# Patient Record
Sex: FEMALE | Race: BLACK OR AFRICAN AMERICAN | NOT HISPANIC OR LATINO | Employment: FULL TIME | URBAN - METROPOLITAN AREA
[De-identification: names, ages, dates, MRNs, and addresses within clinical notes are randomized per-mention and may not be internally consistent; named-entity substitution may affect disease eponyms.]

---

## 2017-03-28 ENCOUNTER — OFFICE VISIT (OUTPATIENT)
Dept: FAMILY MEDICINE | Facility: CLINIC | Age: 52
End: 2017-03-28
Payer: COMMERCIAL

## 2017-03-28 VITALS
WEIGHT: 125 LBS | TEMPERATURE: 98.4 F | BODY MASS INDEX: 18.94 KG/M2 | SYSTOLIC BLOOD PRESSURE: 122 MMHG | OXYGEN SATURATION: 97 % | HEIGHT: 68 IN | DIASTOLIC BLOOD PRESSURE: 64 MMHG | HEART RATE: 92 BPM

## 2017-03-28 DIAGNOSIS — Z87.81 H/O CERVICAL FRACTURE: ICD-10-CM

## 2017-03-28 DIAGNOSIS — Z23 NEED FOR TETANUS BOOSTER: ICD-10-CM

## 2017-03-28 DIAGNOSIS — Z13.1 SCREENING FOR DIABETES MELLITUS: ICD-10-CM

## 2017-03-28 DIAGNOSIS — Z12.4 CERVICAL CANCER SCREENING: ICD-10-CM

## 2017-03-28 DIAGNOSIS — Z00.01 ENCOUNTER FOR ROUTINE ADULT HEALTH EXAMINATION WITH ABNORMAL FINDINGS: Primary | ICD-10-CM

## 2017-03-28 DIAGNOSIS — Z13.6 CARDIOVASCULAR SCREENING; LDL GOAL LESS THAN 160: ICD-10-CM

## 2017-03-28 DIAGNOSIS — L72.9 CYST OF SKIN: ICD-10-CM

## 2017-03-28 DIAGNOSIS — Z12.31 ENCOUNTER FOR SCREENING MAMMOGRAM FOR BREAST CANCER: ICD-10-CM

## 2017-03-28 DIAGNOSIS — Z72.0 TOBACCO ABUSE: ICD-10-CM

## 2017-03-28 DIAGNOSIS — Z12.11 COLON CANCER SCREENING: ICD-10-CM

## 2017-03-28 DIAGNOSIS — Z78.0 POST-MENOPAUSAL: ICD-10-CM

## 2017-03-28 DIAGNOSIS — Z11.59 NEED FOR HEPATITIS C SCREENING TEST: ICD-10-CM

## 2017-03-28 PROCEDURE — G0145 SCR C/V CYTO,THINLAYER,RESCR: HCPCS | Performed by: PHYSICIAN ASSISTANT

## 2017-03-28 PROCEDURE — 99386 PREV VISIT NEW AGE 40-64: CPT | Mod: 25 | Performed by: PHYSICIAN ASSISTANT

## 2017-03-28 PROCEDURE — 90715 TDAP VACCINE 7 YRS/> IM: CPT | Performed by: PHYSICIAN ASSISTANT

## 2017-03-28 PROCEDURE — 87624 HPV HI-RISK TYP POOLED RSLT: CPT | Performed by: PHYSICIAN ASSISTANT

## 2017-03-28 PROCEDURE — 90471 IMMUNIZATION ADMIN: CPT | Performed by: PHYSICIAN ASSISTANT

## 2017-03-28 NOTE — MR AVS SNAPSHOT
After Visit Summary   3/28/2017    Allyson Sauceda    MRN: [id number]           Patient Information     Date Of Birth          1965        Visit Information        Provider Department      3/28/2017 3:40 PM Dia Bolivar PA-C [facility]        Today's Diagnoses     Encounter for screening mammogram for breast cancer    -  1    Cervical cancer screening        Colon cancer screening        CARDIOVASCULAR SCREENING; LDL GOAL LESS THAN 160        Screening for diabetes mellitus        Need for hepatitis C screening test        Need for tetanus booster        H/O cervical fracture        Cyst of skin          Care Instructions      Preventive Health Recommendations  Female Ages 50 - 64    Yearly exam: See your health care provider every year in order to  o Review health changes.   o Discuss preventive care.    o Review your medicines if your doctor has prescribed any.      Get a Pap test every three years (unless you have an abnormal result and your provider advises testing more often).    If you get Pap tests with HPV test, you only need to test every 5 years, unless you have an abnormal result.     You do not need a Pap test if your uterus was removed (hysterectomy) and you have not had cancer.    You should be tested each year for STDs (sexually transmitted diseases) if you're at risk.     Have a mammogram every 1 to 2 years.    Have a colonoscopy at age 50, or have a yearly FIT test (stool test). These exams screen for colon cancer.      Have a cholesterol test every 5 years, or more often if advised.    Have a diabetes test (fasting glucose) every three years. If you are at risk for diabetes, you should have this test more often.     If you are at risk for osteoporosis (brittle bone disease), think about having a bone density scan (DEXA).    Shots: Get a flu shot each year. Get a tetanus shot every 10 years.    Nutrition:     Eat at least 5 servings of fruits and vegetables  each day.    Eat whole-grain bread, whole-wheat pasta and brown rice instead of white grains and rice.    Talk to your provider about Calcium and Vitamin D.     Lifestyle    Exercise at least 150 minutes a week (30 minutes a day, 5 days a week). This will help you control your weight and prevent disease.    Limit alcohol to one drink per day.    No smoking.     Wear sunscreen to prevent skin cancer.     See your dentist every six months for an exam and cleaning.    See your eye doctor every 1 to 2 years.          Follow-ups after your visit        Additional Services     GASTROENTEROLOGY ADULT REF PROCEDURE ONLY       Last Lab Result: No results found for: CR  Body mass index is 19.29 kg/(m^2).      Patient will be contacted to schedule procedure.     Please be aware that coverage of these services is subject to the terms and limitations of your health insurance plan.  Call member services at your health plan with any benefit or coverage questions.  Any procedures must be performed at a [facility] facility OR coordinated by your clinic's referral office.    Please bring the following with you to your appointment:    (1) Any X-Rays, CTs or MRIs which have been performed.  Contact the facility where they were done to arrange for  prior to your scheduled appointment.    (2) List of current medications   (3) This referral request   (4) Any documents/labs given to you for this referral            GENERAL SURG ADULT REFERRAL       Your provider has referred you to: [facility]: [facility] [place] [phone]   [email or web address]    Please be aware that coverage of these services is subject to the terms and limitations of your health insurance plan.  Call member services at your health plan with any benefit or coverage questions.      Please bring the following with you to your appointment:    (1) Any X-Rays, CTs or MRIs which have been performed.  Contact the  "facility where they were done to arrange for  prior to your scheduled appointment.   (2) List of current medications   (3) This referral request   (4) Any documents/labs given to you for this referral                  Future tests that were ordered for you today     Open Future Orders        Priority Expected Expires Ordered    **Hepatitis C Screen Reflex to RNA FUTURE anytime Routine 3/28/2017 3/28/2018 3/28/2017    Comprehensive metabolic panel (BMP + Alb, Alk Phos, ALT, AST, Total. Bili, TP) Routine  4/28/2017 3/28/2017    Lipid Profile with reflex to direct LDL Routine  4/28/2017 3/28/2017    *MA Screening Digital Bilateral Routine  3/28/2018 3/28/2017            Who to contact     If you have questions or need follow up information about today's clinic visit or your schedule please contact [facility] directly at [phone].  Normal or non-critical lab and imaging results will be communicated to you by MyChart, letter or phone within 4 business days after the clinic has received the results. If you do not hear from us within 7 days, please contact the clinic through [employer] or phone. If you have a critical or abnormal lab result, we will notify you by phone as soon as possible.  Submit refill requests through [employer] or call your pharmacy and they will forward the refill request to us. Please allow 3 business days for your refill to be completed.          Additional Information About Your Visit        [employer] Options Information     [employer] lets you send messages to your doctor, view your test results, renew your prescriptions, schedule appointments and more. To sign up, go to [email or web address] . Click on \"Log in\" on the left side of the screen, which will take you to the Welcome page. Then click on \"Sign up Now\" on the right side of the page.     You will be asked to enter the access code listed below, as well as some personal information. Please follow the directions to create your " "username and password.     Your access code is: [email or web address]  Expires: 2017  4:43 PM     Your access code will  in 90 days. If you need help or a new code, please call your [facility] clinic or [phone].        Care EveryWhere ID     This is your Care EveryWhere ID. This could be used by other organizations to access your [facility] medical records  [id number]        Your Vitals Were     Pulse Temperature Height Pulse Oximetry Breastfeeding? BMI (Body Mass Index)    92 98.4  F (36.9  C) (Oral) 5' 7.5\" (1.715 m) 97% No 19.29 kg/m2       Blood Pressure from Last 3 Encounters:   17 122/64    Weight from Last 3 Encounters:   17 125 lb (56.7 kg)              We Performed the Following     GASTROENTEROLOGY ADULT REF PROCEDURE ONLY     GENERAL SURG ADULT REFERRAL     HPV High Risk Types DNA Cervical     Pap imaged thin layer screen with HPV - recommended age 30 - 65 years (select HPV order below)     TDAP VACCINE (BOOSTRIX)        Primary Care Provider    None Specified       No primary provider on file.        Thank you!     Thank you for choosing [facility]  for your care. Our goal is always to provide you with excellent care. Hearing back from our patients is one way we can continue to improve our services. Please take a few minutes to complete the written survey that you may receive in the mail after your visit with us. Thank you!             Your Updated Medication List - Protect others around you: Learn how to safely use, store and throw away your medicines at www.disposemymeds.org.      Notice  As of 3/28/2017  4:43 PM    You have not been prescribed any medications.      "

## 2017-03-28 NOTE — LETTER
April 4, 2017    Allyson Sauceda  [address] [place] [address]    Dear Allyson,  We are happy to inform you that your PAP smear result from 3/28/17 is normal.  We are now able to do a follow up test on PAP smears. The DNA test is for HPV (Human Papilloma Virus). Cervical cancer is closely linked with certain types of HPV. Your result showed no evidence of high risk HPV.  Therefore we recommend you return in 3 years for your next pap smear.  You will still need to return to the clinic every year for an annual exam and other preventive tests.  Please contact the clinic at [phone] with any questions.  Sincerely,    Dia Bolivar PA-C/isabelle

## 2017-03-28 NOTE — PROGRESS NOTES
"   SUBJECTIVE:     CC: Allyson Sauceda is an 51 year old woman who presents for preventive health visit.     Healthy Habits:    Do you get at least three servings of calcium containing foods daily (dairy, green leafy vegetables, etc.)? no, taking calcium and/or vitamin D supplement: no - encouraged to start multivitamin.     Amount of exercise or daily activities, outside of work: none currently but starting this week three times a week with a . Trying to gain tone and muscle mass.    Problems taking medications regularly No    Medication side effects: No    Have you had an eye exam in the past two years? No     Do you see a dentist twice per year? yes    Do you have sleep apnea, excessive snoring or daytime drowsiness? no      Today's PHQ-2 Score: No flowsheet data found.    Abuse: Current or Past(Physical, Sexual or Emotional)- No  Do you feel safe in your environment - Yes    Social History   Substance Use Topics     Smoking status: Current Every Day Smoker     Types: Cigarettes     Smokeless tobacco: Not on file     Alcohol use No     The patient does not drink >3 drinks per day nor >7 drinks per week.    No results for input(s): CHOL, HDL, LDL, TRIG, CHOLHDLRATIO, NHDL in the last 65099 hours.    Reviewed orders with patient.  Reviewed health maintenance and updated orders accordingly - Yes    Mammo Decision Support:  Patient over age 50, mutual decision to screen reflected in health maintenance.    Pertinent mammograms are reviewed under the imaging tab.  History of abnormal Pap smear: NO - age 30- 65 PAP every 3 years recommended  Moved to [place] for past 10 yrs. Estimates 1-2 paps within that time frame.  Has had \"many\" in her lifetime which have all been normal.   with 1 partner    Reviewed and updated as needed this visit by clinical staff  Tobacco  Allergies  Meds  Med Hx  Surg Hx  Fam Hx  Soc Hx        Reviewed and updated as needed this visit by Provider            ROS:  C: NEGATIVE for " "fever, chills, change in weight  I: + for skin lesion she's had for years. Started small and seems to have gotten bigger over the years. Estimates shes had this for 10 yrs, but doesn't cause her any issues. NEGATIVE for worrisome rashes, moles or lesions  E: NEGATIVE for vision changes or irritation  ENT: NEGATIVE for ear, mouth and throat problems  R: NEGATIVE for significant cough or SOB  B: NEGATIVE for masses, tenderness or discharge  CV: NEGATIVE for chest pain, palpitations or peripheral edema  GI: NEGATIVE for nausea, abdominal pain, heartburn, or change in bowel habits  : NEGATIVE for unusual urinary or vaginal symptoms. No vaginal bleeding - LMP 2013  M: NEGATIVE for significant arthralgias or myalgia  N: NEGATIVE for weakness, dizziness or paresthesias  P: NEGATIVE for changes in mood or affect     Problem list, Medication list, Allergies, and Medical/Social/Surgical histories reviewed in EPIC and updated as appropriate.  OBJECTIVE:     /64 (BP Location: Right arm, Cuff Size: Adult Regular)  Pulse 92  Temp 98.4  F (36.9  C) (Oral)  Ht 5' 7.5\" (1.715 m)  Wt 125 lb (56.7 kg)  SpO2 97%  Breastfeeding? No  BMI 19.29 kg/m2  EXAM:  GENERAL: healthy, alert and no distress  EYES: Eyes grossly normal to inspection, PERRL and conjunctivae and sclerae normal  HENT: ear canals and TM's normal, nose and mouth without ulcers or lesions  NECK: no adenopathy, no asymmetry, masses, or scars and thyroid normal to palpation  RESP: lungs clear to auscultation - no rales, rhonchi or wheezes  BREAST: normal without masses, tenderness or nipple discharge and no palpable axillary masses or adenopathy  CV: regular rate and rhythm, normal S1 S2, no S3 or S4, no murmur, click or rub, no peripheral edema and peripheral pulses strong  ABDOMEN: soft, nontender, no hepatosplenomegaly, no masses and bowel sounds normal   (female): normal female external genitalia, normal urethral meatus, vaginal mucosa pink, moist, well " rugated, and normal cervix/adnexa/uterus without masses or discharge  MS: no gross musculoskeletal defects noted, no edema  SKIN: small cyst like prominence that is just distal to her L elbow adjacent to ulnar cubital tunnel - nontender and no evidence for infection. No other suspicious lesions or rashes  NEURO: Normal strength and tone, mentation intact and speech normal  PSYCH: mentation appears normal, affect normal/bright    ASSESSMENT/PLAN:         ICD-10-CM    1. Encounter for routine adult health examination with abnormal findings Z00.01    2. Encounter for screening mammogram for breast cancer Z12.31 *MA Screening Digital Bilateral   3. Cervical cancer screening Z12.4 Pap imaged thin layer screen with HPV - recommended age 30 - 65 years (select HPV order below)     HPV High Risk Types DNA Cervical   4. Colon cancer screening Z12.11 GASTROENTEROLOGY ADULT REF PROCEDURE ONLY   5. CARDIOVASCULAR SCREENING; LDL GOAL LESS THAN 160 Z13.6 Comprehensive metabolic panel (BMP + Alb, Alk Phos, ALT, AST, Total. Bili, TP)     Lipid Profile with reflex to direct LDL   6. Screening for diabetes mellitus Z13.1 Comprehensive metabolic panel (BMP + Alb, Alk Phos, ALT, AST, Total. Bili, TP)   7. Need for hepatitis C screening test Z11.59 **Hepatitis C Screen Reflex to RNA FUTURE anytime   8. Need for tetanus booster Z23 TDAP VACCINE (BOOSTRIX)   9. H/O cervical fracture Z87.81    10. Cyst of skin L72.9 GENERAL SURG ADULT REFERRAL   11. Post-menopausal Z78.0    12. Tobacco abuse Z72.0 nicotine (NICODERM CQ) 14 MG/24HR 24 hr patch     nicotine (NICODERM CQ) 21 MG/24HR 24 hr patch     nicotine (NICODERM CQ) 7 MG/24HR 24 hr patch   Given placement of cyst advised gen surg consultation as pt would like to have removed.  Referral placed and info given to her to schedule.  Discussed smoking cessation and she would be willing to try nicoderm patches. Encouraged 1 month f/u for additional cessation counseling.     COUNSELING:  "  Reviewed preventive health counseling, as reflected in patient instructions       Regular exercise       Healthy diet/nutrition       Vaccinated for: TDAP       Osteoporosis Prevention/Bone Health       Colon cancer screening       Consider Hep C screening for patients born between 1945 and 1965       (Jalyn)menopause management    BP Screening:   Last 3 BP Readings:    BP Readings from Last 3 Encounters:   03/28/17 122/64       The following was recommended to the patient:  Re-screen BP within a year and recommended lifestyle modifications     reports that she has been smoking Cigarettes.  She does not have any smokeless tobacco history on file.  Tobacco Cessation Action Plan: Pharmacotherapies : Nicotine patch with re-check in 1 month for continued cessation counseling.  Estimated body mass index is 19.29 kg/(m^2) as calculated from the following:    Height as of this encounter: 5' 7.5\" (1.715 m).    Weight as of this encounter: 125 lb (56.7 kg).   Weight management plan other subspecialty referral pt enrolled at her gym with nutritionist and to put on muscle mass.     Counseling Resources:  ATP IV Guidelines  Pooled Cohorts Equation Calculator  Breast Cancer Risk Calculator  FRAX Risk Assessment  ICSI Preventive Guidelines  Dietary Guidelines for Americans, 2010  USDA's MyPlate  ASA Prophylaxis  Lung CA Screening    Dia Bolivar PA-C  [facility] [place]  "

## 2017-03-28 NOTE — NURSING NOTE
"Chief Complaint   Patient presents with     Physical       Initial /64 (BP Location: Right arm, Cuff Size: Adult Regular)  Pulse 92  Temp 98.4  F (36.9  C) (Oral)  Ht 5' 7.5\" (1.715 m)  Wt 125 lb (56.7 kg)  SpO2 97%  Breastfeeding? No  BMI 19.29 kg/m2 Estimated body mass index is 19.29 kg/(m^2) as calculated from the following:    Height as of this encounter: 5' 7.5\" (1.715 m).    Weight as of this encounter: 125 lb (56.7 kg).  Medication Reconciliation: complete    "

## 2017-03-30 LAB
COPATH REPORT: NORMAL
PAP: NORMAL

## 2017-03-31 PROBLEM — Z78.0 POST-MENOPAUSAL: Status: ACTIVE | Noted: 2017-03-31

## 2017-03-31 PROBLEM — Z72.0 TOBACCO ABUSE: Status: ACTIVE | Noted: 2017-03-31

## 2017-03-31 RX ORDER — NICOTINE 21 MG/24HR
1 PATCH, TRANSDERMAL 24 HOURS TRANSDERMAL EVERY 24 HOURS
Qty: 30 PATCH | Refills: 0 | Status: SHIPPED | OUTPATIENT
Start: 2017-03-31 | End: 2021-07-02

## 2017-04-03 LAB
FINAL DIAGNOSIS: NORMAL
HPV HR 12 DNA CVX QL NAA+PROBE: NEGATIVE
HPV16 DNA SPEC QL NAA+PROBE: NEGATIVE
HPV18 DNA SPEC QL NAA+PROBE: NEGATIVE
SPECIMEN DESCRIPTION: NORMAL

## 2017-04-04 ENCOUNTER — RADIANT APPOINTMENT (OUTPATIENT)
Dept: MAMMOGRAPHY | Facility: CLINIC | Age: 52
End: 2017-04-04
Payer: COMMERCIAL

## 2017-04-04 DIAGNOSIS — Z12.31 ENCOUNTER FOR SCREENING MAMMOGRAM FOR BREAST CANCER: ICD-10-CM

## 2017-04-04 PROCEDURE — G0202 SCR MAMMO BI INCL CAD: HCPCS | Mod: TC

## 2017-04-07 DIAGNOSIS — Z13.6 CARDIOVASCULAR SCREENING; LDL GOAL LESS THAN 160: ICD-10-CM

## 2017-04-07 DIAGNOSIS — Z13.1 SCREENING FOR DIABETES MELLITUS: ICD-10-CM

## 2017-04-07 DIAGNOSIS — Z11.59 NEED FOR HEPATITIS C SCREENING TEST: ICD-10-CM

## 2017-04-07 LAB
ALBUMIN SERPL-MCNC: 3.9 G/DL (ref 3.4–5)
ALP SERPL-CCNC: 76 U/L (ref 40–150)
ALT SERPL W P-5'-P-CCNC: 27 U/L (ref 0–50)
ANION GAP SERPL CALCULATED.3IONS-SCNC: 8 MMOL/L (ref 3–14)
AST SERPL W P-5'-P-CCNC: 29 U/L (ref 0–45)
BILIRUB SERPL-MCNC: 1 MG/DL (ref 0.2–1.3)
BUN SERPL-MCNC: 9 MG/DL (ref 7–30)
CALCIUM SERPL-MCNC: 9.5 MG/DL (ref 8.5–10.1)
CHLORIDE SERPL-SCNC: 109 MMOL/L (ref 94–109)
CHOLEST SERPL-MCNC: 174 MG/DL
CO2 SERPL-SCNC: 27 MMOL/L (ref 20–32)
CREAT SERPL-MCNC: 0.7 MG/DL (ref 0.52–1.04)
GFR SERPL CREATININE-BSD FRML MDRD: 89 ML/MIN/1.7M2
GLUCOSE SERPL-MCNC: 96 MG/DL (ref 70–99)
HDLC SERPL-MCNC: 60 MG/DL
LDLC SERPL CALC-MCNC: 98 MG/DL
NONHDLC SERPL-MCNC: 114 MG/DL
POTASSIUM SERPL-SCNC: 3.7 MMOL/L (ref 3.4–5.3)
PROT SERPL-MCNC: 7.4 G/DL (ref 6.8–8.8)
SODIUM SERPL-SCNC: 144 MMOL/L (ref 133–144)
TRIGL SERPL-MCNC: 81 MG/DL

## 2017-04-07 PROCEDURE — 80061 LIPID PANEL: CPT | Performed by: PHYSICIAN ASSISTANT

## 2017-04-07 PROCEDURE — 80053 COMPREHEN METABOLIC PANEL: CPT | Performed by: PHYSICIAN ASSISTANT

## 2017-04-07 PROCEDURE — 36415 COLL VENOUS BLD VENIPUNCTURE: CPT | Performed by: PHYSICIAN ASSISTANT

## 2017-04-07 PROCEDURE — 86803 HEPATITIS C AB TEST: CPT | Performed by: PHYSICIAN ASSISTANT

## 2017-04-09 LAB — HCV AB SERPL QL IA: NORMAL

## 2017-04-11 ENCOUNTER — TELEPHONE (OUTPATIENT)
Dept: FAMILY MEDICINE | Facility: CLINIC | Age: 52
End: 2017-04-11

## 2017-04-11 DIAGNOSIS — L72.9 CYST OF SKIN: Primary | ICD-10-CM

## 2017-04-11 NOTE — TELEPHONE ENCOUNTER
Reason for Call: Patient called because she has a referral to general Surgery for her Cyst but whe she called they stated she should be seen by orthopedics.    Best phone number to reach pt at is: [phone]  Ok to leave a message with medical info? YES    Pharmacy preferred (if calling for a refill): [facility]  [facility]-Patient Representative

## 2017-04-11 NOTE — TELEPHONE ENCOUNTER
Given close proximity to elbow they may have felt ortho was a better fit. New referral placed. Please notify.  Electronically Signed By: Dia Bolivar PA-C

## 2017-04-11 NOTE — TELEPHONE ENCOUNTER
Please see message from patient below. Please advise. Thank you.  Annette Serrano RN, BSN  [facility]

## 2017-04-12 ENCOUNTER — OFFICE VISIT (OUTPATIENT)
Dept: FAMILY MEDICINE | Facility: CLINIC | Age: 52
End: 2017-04-12
Payer: COMMERCIAL

## 2017-04-12 VITALS
WEIGHT: 124 LBS | HEIGHT: 68 IN | OXYGEN SATURATION: 97 % | DIASTOLIC BLOOD PRESSURE: 64 MMHG | SYSTOLIC BLOOD PRESSURE: 98 MMHG | HEART RATE: 88 BPM | BODY MASS INDEX: 18.79 KG/M2 | TEMPERATURE: 98.1 F

## 2017-04-12 DIAGNOSIS — M79.642 PAIN OF LEFT HAND: Primary | ICD-10-CM

## 2017-04-12 PROCEDURE — 99213 OFFICE O/P EST LOW 20 MIN: CPT | Performed by: PHYSICIAN ASSISTANT

## 2017-04-12 NOTE — NURSING NOTE
"Chief Complaint   Patient presents with     Hand Pain       Initial There were no vitals taken for this visit. Estimated body mass index is 19.29 kg/(m^2) as calculated from the following:    Height as of 3/28/17: 5' 7.5\" (1.715 m).    Weight as of 3/28/17: 125 lb (56.7 kg).  Medication Reconciliation: complete    "

## 2017-04-12 NOTE — MR AVS SNAPSHOT
"              After Visit Summary   4/12/2017    Allyson Sauceda    MRN: [id number]           Patient Information     Date Of Birth          1965        Visit Information        Provider Department      4/12/2017 1:40 PM Dia Bolivar PA-C [facility] [place]        Care Instructions    Hx/exam most suggestive of possible early trigger finger, but no evidence for locking on exam today.  This may have been contributed to by recent increase in weight-lifting.  Encouraged to modify types of exercise, ice, avoid significant gripping/grasping and may take NSAIDs.  Re-check if worsening or new concerns.    Electronically Signed By: Dia Bolivar PA-C          Follow-ups after your visit        Who to contact     If you have questions or need follow up information about today's clinic visit or your schedule please contact [facility] [place] directly at [phone].  Normal or non-critical lab and imaging results will be communicated to you by MyChart, letter or phone within 4 business days after the clinic has received the results. If you do not hear from us within 7 days, please contact the clinic through MyChart or phone. If you have a critical or abnormal lab result, we will notify you by phone as soon as possible.  Submit refill requests through [employer] or call your pharmacy and they will forward the refill request to us. Please allow 3 business days for your refill to be completed.          Additional Information About Your Visit        MyChart Information     [employer] lets you send messages to your doctor, view your test results, renew your prescriptions, schedule appointments and more. To sign up, go to [email or web address] . Click on \"Log in\" on the left side of the screen, which will take you to the Welcome page. Then click on \"Sign up Now\" on the right side of the page.     You will be asked to enter the access code listed below, as well as some personal information. Please follow " "the directions to create your username and password.     Your access code is: [email or web address]  Expires: 2017  4:43 PM     Your access code will  in 90 days. If you need help or a new code, please call your [facility] clinic or [phone].        Care EveryWhere ID     This is your Care EveryWhere ID. This could be used by other organizations to access your [facility] medical records  [id number]        Your Vitals Were     Pulse Temperature Height Pulse Oximetry BMI (Body Mass Index)       105 98.1  F (36.7  C) (Oral) 5' 7.5\" (1.715 m) 97% 19.13 kg/m2        Blood Pressure from Last 3 Encounters:   17 98/64   17 122/64    Weight from Last 3 Encounters:   17 124 lb (56.2 kg)   17 125 lb (56.7 kg)              Today, you had the following     No orders found for display       Primary Care Provider    None Specified       No primary provider on file.        Thank you!     Thank you for choosing [facility] [place]  for your care. Our goal is always to provide you with excellent care. Hearing back from our patients is one way we can continue to improve our services. Please take a few minutes to complete the written survey that you may receive in the mail after your visit with us. Thank you!             Your Updated Medication List - Protect others around you: Learn how to safely use, store and throw away your medicines at www.disposemymeds.org.          This list is accurate as of: 17  2:06 PM.  Always use your most recent med list.                   Brand Name Dispense Instructions for use    * nicotine 14 MG/24HR 24 hr patch    NICODERM CQ    30 patch    Place 1 patch onto the skin every 24 hours       * nicotine 21 MG/24HR 24 hr patch    NICODERM CQ    30 patch    Place 1 patch onto the skin every 24 hours       * nicotine 7 MG/24HR 24 hr patch    NICODERM CQ    30 patch    Place 1 patch onto the skin every 24 hours       * Notice:  This list has 3 medication(s) that are the " same as other medications prescribed for you. Read the directions carefully, and ask your doctor or other care provider to review them with you.

## 2017-04-12 NOTE — PATIENT INSTRUCTIONS
Hx/exam most suggestive of possible early trigger finger, but no evidence for locking on exam today.  This may have been contributed to by recent increase in weight-lifting.  Encouraged to modify types of exercise, ice, avoid significant gripping/grasping and may take NSAIDs.  Re-check if worsening or new concerns.    Electronically Signed By: Dia Bolivar PA-C

## 2017-04-12 NOTE — PROGRESS NOTES
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    Did start working with a  over the past 3 weeks and has been doing more weight lifting.       Description:   Location: left hand, specifically middle finger  Character: not really a pain specifically - more discomfort. Finger gets stuck when she opens and closes her hand    Intensity: mild - describes as more discomfort    Progression of Symptoms: worse    Accompanying Signs & Symptoms: never is swollen.  Other symptoms: none   History:   Previous similar pain: no       Precipitating factors:   Trauma or overuse: no     Alleviating factors:  Improved by: using some aspercream and it seemed to help a little bit.       Therapies Tried and outcome: aspercream    2) Discuss recent labs    Problem list and histories reviewed & adjusted, as indicated.  Additional history: as documented    Patient Active Problem List   Diagnosis     CARDIOVASCULAR SCREENING; LDL GOAL LESS THAN 160     H/O cervical fracture     Post-menopausal     Tobacco abuse     Past Surgical History:   Procedure Laterality Date     NECK SURGERY  2005    C5-6 fracture - has plates placed       Social History   Substance Use Topics     Smoking status: Current Every Day Smoker     Packs/day: 0.50     Years: 30.00     Types: Cigarettes     Smokeless tobacco: Not on file     Alcohol use No     Family History   Problem Relation Age of Onset     DIABETES Mother      Hyperlipidemia Mother      Alzheimer Disease Mother      Hypertension Mother      Alzheimer Disease Maternal Grandmother      Alzheimer Disease Maternal Aunt      Breast Cancer No family hx of      Coronary Artery Disease No family hx of      Colon Cancer No family hx of      Thyroid  "Disease No family hx of      Genetic Disorder No family hx of          Current Outpatient Prescriptions   Medication Sig Dispense Refill     nicotine (NICODERM CQ) 14 MG/24HR 24 hr patch Place 1 patch onto the skin every 24 hours (Patient not taking: Reported on 4/12/2017) 30 patch 0     nicotine (NICODERM CQ) 21 MG/24HR 24 hr patch Place 1 patch onto the skin every 24 hours (Patient not taking: Reported on 4/12/2017) 30 patch 0     nicotine (NICODERM CQ) 7 MG/24HR 24 hr patch Place 1 patch onto the skin every 24 hours (Patient not taking: Reported on 4/12/2017) 30 patch 0     No Known Allergies    Reviewed and updated as needed this visit by clinical staff  Tobacco  Allergies  Meds  Med Hx  Surg Hx  Fam Hx  Soc Hx      Reviewed and updated as needed this visit by Provider  Tobacco  Allergies  Meds  Med Hx  Surg Hx  Fam Hx  Soc Hx        ROS:  CONSTITUTIONAL:NEGATIVE for fever, chills, change in weight  INTEGUMENTARY/SKIN: NEGATIVE for bruising, swelling or redness.   MUSCULOSKELETAL: POSITIVE  for L hand pain.    OBJECTIVE:                                                    BP 98/64 (BP Location: Right arm, Cuff Size: Adult Regular)  Pulse 88  Temp 98.1  F (36.7  C) (Oral)  Ht 5' 7.5\" (1.715 m)  Wt 124 lb (56.2 kg)  SpO2 97%  BMI 19.13 kg/m2  Body mass index is 19.13 kg/(m^2).  GENERAL: healthy, alert and no distress  MS: reports central palmar \"discomfort not pain\" and sensation like her L 3rd finger will get stuck. Can flex/extend all fingers today without any trouble though. No evidence of current triggering or locking. Equal and symmetric  strength. No bony deformity, synovial thickening or any swelling is seen. No dupuytrens contracture.    Diagnostic Test Results:  Results for orders placed or performed in visit on 04/07/17   Comprehensive metabolic panel (BMP + Alb, Alk Phos, ALT, AST, Total. Bili, TP)   Result Value Ref Range    Sodium 144 133 - 144 mmol/L    Potassium 3.7 3.4 - 5.3 " mmol/L    Chloride 109 94 - 109 mmol/L    Carbon Dioxide 27 20 - 32 mmol/L    Anion Gap 8 3 - 14 mmol/L    Glucose 96 70 - 99 mg/dL    Urea Nitrogen 9 7 - 30 mg/dL    Creatinine 0.70 0.52 - 1.04 mg/dL    GFR Estimate 89 >60 mL/min/1.7m2    GFR Estimate If Black >90   GFR Calc   >60 mL/min/1.7m2    Calcium 9.5 8.5 - 10.1 mg/dL    Bilirubin Total 1.0 0.2 - 1.3 mg/dL    Albumin 3.9 3.4 - 5.0 g/dL    Protein Total 7.4 6.8 - 8.8 g/dL    Alkaline Phosphatase 76 40 - 150 U/L    ALT 27 0 - 50 U/L    AST 29 0 - 45 U/L   Lipid Profile with reflex to direct LDL   Result Value Ref Range    Cholesterol 174 <200 mg/dL    Triglycerides 81 <150 mg/dL    HDL Cholesterol 60 >49 mg/dL    LDL Cholesterol Calculated 98 <100 mg/dL    Non HDL Cholesterol 114 <130 mg/dL   **Hepatitis C Screen Reflex to RNA FUTURE anytime   Result Value Ref Range    Hepatitis C Antibody  NR     Nonreactive   Assay performance characteristics have not been established for newborns,   infants, and children            ASSESSMENT/PLAN:                                                        ICD-10-CM    1. Pain of left hand M79.642    Describes trigger finger or some flexor tendonitis given recent increase in weight-lifting.  Provided reassurance that I do not think this is c/w arthritis.  Reviewed all nl labs from recent CPE as well.  See Patient Instructions  Patient Instructions   Hx/exam most suggestive of possible early trigger finger, but no evidence for locking on exam today.  This may have been contributed to by recent increase in weight-lifting.  Encouraged to modify types of exercise, ice, avoid significant gripping/grasping and may take NSAIDs.  Re-check if worsening or new concerns.    Electronically Signed By: Dia Bolivar PA-C

## 2017-06-12 ENCOUNTER — OFFICE VISIT (OUTPATIENT)
Dept: ORTHOPEDICS | Facility: CLINIC | Age: 52
End: 2017-06-12
Payer: COMMERCIAL

## 2017-06-12 VITALS
SYSTOLIC BLOOD PRESSURE: 102 MMHG | WEIGHT: 121 LBS | HEIGHT: 68 IN | DIASTOLIC BLOOD PRESSURE: 66 MMHG | BODY MASS INDEX: 18.34 KG/M2

## 2017-06-12 DIAGNOSIS — R22.32 ELBOW MASS, LEFT: Primary | ICD-10-CM

## 2017-06-12 PROCEDURE — 99203 OFFICE O/P NEW LOW 30 MIN: CPT | Performed by: ORTHOPAEDIC SURGERY

## 2017-06-12 NOTE — MR AVS SNAPSHOT
"              After Visit Summary   2017    Allyson Sauceda    MRN: [id number]           Patient Information     Date Of Birth          1965        Visit Information        Provider Department      2017 2:40 PM Sean Valadez MD [facility]        Today's Diagnoses     Elbow mass, left    -  1       Follow-ups after your visit        Who to contact     If you have questions or need follow up information about today's clinic visit or your schedule please contact [facility] directly at [phone].  Normal or non-critical lab and imaging results will be communicated to you by MICMALIhart, letter or phone within 4 business days after the clinic has received the results. If you do not hear from us within 7 days, please contact the clinic through [employer] or phone. If you have a critical or abnormal lab result, we will notify you by phone as soon as possible.  Submit refill requests through [employer] or call your pharmacy and they will forward the refill request to us. Please allow 3 business days for your refill to be completed.          Additional Information About Your Visit        MyChart Information     [employer] lets you send messages to your doctor, view your test results, renew your prescriptions, schedule appointments and more. To sign up, go to [email or web address] . Click on \"Log in\" on the left side of the screen, which will take you to the Welcome page. Then click on \"Sign up Now\" on the right side of the page.     You will be asked to enter the access code listed below, as well as some personal information. Please follow the directions to create your username and password.     Your access code is: [email or web address]  Expires: 2017  4:43 PM     Your access code will  in 90 days. If you need help or a new code, please call your [facility] or [phone].        Care EveryWhere ID     This is your Care EveryWhere ID. This could be used by other " "organizations to access your [place] medical records  [id number]        Your Vitals Were     Height BMI (Body Mass Index)                5' 7.9\" (1.725 m) 18.45 kg/m2           Blood Pressure from Last 3 Encounters:   06/12/17 102/66   04/12/17 98/64   03/28/17 122/64    Weight from Last 3 Encounters:   06/12/17 121 lb (54.9 kg)   04/12/17 124 lb (56.2 kg)   03/28/17 125 lb (56.7 kg)              Today, you had the following     No orders found for display       Primary Care Provider    None Specified       No primary provider on file.        Thank you!     Thank you for choosing [facility]  for your care. Our goal is always to provide you with excellent care. Hearing back from our patients is one way we can continue to improve our services. Please take a few minutes to complete the written survey that you may receive in the mail after your visit with us. Thank you!             Your Updated Medication List - Protect others around you: Learn how to safely use, store and throw away your medicines at www.disposemymeds.org.          This list is accurate as of: 6/12/17  3:15 PM.  Always use your most recent med list.                   Brand Name Dispense Instructions for use    HAIR SKIN & NAILS GUMMIES PO          MULTIVITAMIN GUMMIES ADULTS PO          * nicotine 14 MG/24HR 24 hr patch    NICODERM CQ    30 patch    Place 1 patch onto the skin every 24 hours       * nicotine 21 MG/24HR 24 hr patch    NICODERM CQ    30 patch    Place 1 patch onto the skin every 24 hours       * nicotine 7 MG/24HR 24 hr patch    NICODERM CQ    30 patch    Place 1 patch onto the skin every 24 hours       * Notice:  This list has 3 medication(s) that are the same as other medications prescribed for you. Read the directions carefully, and ask your doctor or other care provider to review them with you.      "

## 2017-06-12 NOTE — PROGRESS NOTES
HISTORY OF PRESENT ILLNESS:    Allyson Sauceda is a 51 year old female who is seen in consultation at the request of Dr. Bolivar for left elbow, cyst.    Present symptoms: Pt states cyst has been present for over a year, has slowly grown in size.  States it is not painful but will have some discomfort if resting on elbow for extended periods.  She is right-hand dominant.  No limitation of elbow range of motion  No constitutional symptoms Of significant weight loss, nighttime fever or chills  Treatments tried to this point: nothing  Orthopedic PMH: neck surgery - C5/6    No past medical history on file.None other than prior neck problem    Past Surgical History:   Procedure Laterality Date     NECK SURGERY  2005    C5-6 fracture - has plates placed       Family History   Problem Relation Age of Onset     DIABETES Mother      Hyperlipidemia Mother      Alzheimer Disease Mother      Hypertension Mother      Alzheimer Disease Maternal Grandmother      Alzheimer Disease Maternal Aunt      Breast Cancer No family hx of      Coronary Artery Disease No family hx of      Colon Cancer No family hx of      Thyroid Disease No family hx of      Genetic Disorder No family hx of        Social History     Social History     Marital status:      Spouse name: N/A     Number of children: N/A     Years of education: N/A     Occupational History     Not on file.     Social History Main Topics     Smoking status: Current Every Day Smoker     Packs/day: 0.50     Years: 30.00     Types: Cigarettes     Smokeless tobacco: Not on file     Alcohol use No     Drug use: Yes      Comment: Marjuana     Sexual activity: Yes     Partners: Male     Other Topics Concern     Not on file     Social History Narrative       Current Outpatient Prescriptions   Medication Sig Dispense Refill     Multiple Vitamins-Minerals (MULTIVITAMIN GUMMIES ADULTS PO)        Biotin w/ Vitamins C & E (HAIR SKIN & NAILS GUMMIES PO)        nicotine (NICODERM CQ) 14  "MG/24HR 24 hr patch Place 1 patch onto the skin every 24 hours (Patient not taking: Reported on 4/12/2017) 30 patch 0     nicotine (NICODERM CQ) 21 MG/24HR 24 hr patch Place 1 patch onto the skin every 24 hours (Patient not taking: Reported on 4/12/2017) 30 patch 0     nicotine (NICODERM CQ) 7 MG/24HR 24 hr patch Place 1 patch onto the skin every 24 hours (Patient not taking: Reported on 4/12/2017) 30 patch 0       No Known Allergies    REVIEW OF SYSTEMS:  CONSTITUTIONAL:  NEGATIVE for fever, chills, change in weight  INTEGUMENTARY/SKIN:  NEGATIVE for worrisome rashes, moles or lesions  EYES:  NEGATIVE for vision changes or irritation  ENT/MOUTH:  NEGATIVE for ear, mouth and throat problems  RESP:  NEGATIVE for significant cough or SOB  BREAST:  NEGATIVE for masses, tenderness or discharge  CV:  NEGATIVE for chest pain, palpitations or peripheral edema  GI:  abdominal pain  :  Negative   MUSCULOSKELETAL:  See HPI above  NEURO:  NEGATIVE for weakness, dizziness or paresthesias  ENDOCRINE:  NEGATIVE for temperature intolerance, skin/hair changes  HEME/ALLERGY/IMMUNE:  NEGATIVE for bleeding problems  PSYCHIATRIC:  NEGATIVE for changes in mood or affect      PHYSICAL EXAM:  /66 (BP Location: Right arm, Patient Position: Chair, Cuff Size: Adult Small)  Ht 5' 7.9\" (1.725 m)  Wt 121 lb (54.9 kg)  BMI 18.45 kg/m2  Body mass index is 18.45 kg/(m^2).   GENERAL APPEARANCE: healthy, alert and no distress   HEENT: No apparent thyroid megaly. Clear sclera with normal ocular movement  RESPIRATORY: No labored breathing  SKIN: no suspicious lesions or rashes  NEURO: Normal strength and tone, mentation intact and speech normal  VASCULAR: Good pulses, and capillary refill   LYMPH: no lymphadenopathy   PSYCH:  mentation appears normal and affect normal/bright    MUSCULOSKELETAL:  Presence of semi-firm subcutaneous mass in the olecranon region of the left elbow is noted  It is not pulsatile  No fluctuant nature is noted  The " size of the mass is about 1.5 cm in diameter, fairly circular  Full flexion and extension of left elbow is noted  The particular tenderness with palpation of the mass  Distal neurovascular status is intact     ASSESSMENT:  Posterior elbow mass, 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  Given the fact that she has noted rather noticeable enlargement of the size, it is very reasonable for her to consider surgical excision.  Choices for anesthesia, namely local versus local MAC were explained.  If she were to do it, she is leaning towards doing it under local MAC.  Possible recurrent formation assist and potential wound issues including the possibility of keloid were thoroughly explained.  I asked her to go home and think it through and get back to us if she decided to go ahead with the surgery sometime in the future.  The proposed surgery would include excision of the left elbow mass under local MAC.      Imaging Interpretation:   None taken today    Sean Valadez MD  Department of Orthopedic Surgery        Disclaimer: This note consists of symbols derived from keyboarding, dictation and/or voice recognition software. As a result, there may be errors in the script that have gone undetected. Please consider this when interpreting information found in this chart.

## 2017-06-12 NOTE — NURSING NOTE
"Chief Complaint   Patient presents with     Cyst     Left elbow       Initial /66 (BP Location: Right arm, Patient Position: Chair, Cuff Size: Adult Small)  Ht 5' 7.9\" (1.725 m)  Wt 121 lb (54.9 kg)  BMI 18.45 kg/m2 Estimated body mass index is 18.45 kg/(m^2) as calculated from the following:    Height as of this encounter: 5' 7.9\" (1.725 m).    Weight as of this encounter: 121 lb (54.9 kg).  Medication Reconciliation: complete    "

## 2017-06-15 ENCOUNTER — TELEPHONE (OUTPATIENT)
Dept: ORTHOPEDICS | Facility: CLINIC | Age: 52
End: 2017-06-15

## 2017-06-15 NOTE — TELEPHONE ENCOUNTER
Scheduled surgery for  Excision of left elbow  on 7/05/2017 with Dr. Valadez @ [facility] @ 2:45.  Surgery education packet provided to patient.

## 2017-06-23 ENCOUNTER — TELEPHONE (OUTPATIENT)
Dept: FAMILY MEDICINE | Facility: CLINIC | Age: 52
End: 2017-06-23

## 2017-06-23 NOTE — TELEPHONE ENCOUNTER
Pt called and would like to know if she can be referred to see a specialist for her hand pain. She can be reached at [phone]. States she has seen LT for this issue 3 times.    Ruddy Ortiz  Patient Representative-[facility]

## 2017-06-23 NOTE — TELEPHONE ENCOUNTER
Dia please see below and advise. You saw patient on 4/12 for this issue. Or would this referral come from Dr. Valadez, sports med?  Thank you,   Antonella Garcia R.N.

## 2017-06-26 ENCOUNTER — RADIANT APPOINTMENT (OUTPATIENT)
Dept: GENERAL RADIOLOGY | Facility: CLINIC | Age: 52
End: 2017-06-26
Attending: PHYSICIAN ASSISTANT
Payer: COMMERCIAL

## 2017-06-26 ENCOUNTER — OFFICE VISIT (OUTPATIENT)
Dept: FAMILY MEDICINE | Facility: CLINIC | Age: 52
End: 2017-06-26
Payer: COMMERCIAL

## 2017-06-26 VITALS
SYSTOLIC BLOOD PRESSURE: 98 MMHG | TEMPERATURE: 98.7 F | HEIGHT: 68 IN | WEIGHT: 121 LBS | HEART RATE: 72 BPM | BODY MASS INDEX: 18.34 KG/M2 | OXYGEN SATURATION: 97 % | DIASTOLIC BLOOD PRESSURE: 60 MMHG

## 2017-06-26 DIAGNOSIS — M79.645 PAIN OF FINGER OF LEFT HAND: ICD-10-CM

## 2017-06-26 DIAGNOSIS — M79.644 PAIN OF RIGHT THUMB: ICD-10-CM

## 2017-06-26 DIAGNOSIS — Z01.818 PREOP GENERAL PHYSICAL EXAM: Primary | ICD-10-CM

## 2017-06-26 DIAGNOSIS — Z72.0 TOBACCO ABUSE: ICD-10-CM

## 2017-06-26 PROCEDURE — 99214 OFFICE O/P EST MOD 30 MIN: CPT | Performed by: PHYSICIAN ASSISTANT

## 2017-06-26 PROCEDURE — 73140 X-RAY EXAM OF FINGER(S): CPT | Mod: RT

## 2017-06-26 NOTE — TELEPHONE ENCOUNTER
We certainly could consider having her be evaluated by the hand specialist, but if she is continuing to have pain we might want to consider further lab work-up first for rheumatoid arthritis. I think it's best we re-evaluate her for this first. Is she ok with that? I have seen her only 2x and only once for her hand pain.   Electronically Signed By: Dia Bolivar PA-C
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
## 2017-06-26 NOTE — TELEPHONE ENCOUNTER
"Called # below     \" the person you are trying to reach is not accepting calls at this time\"     Will attempt later     Peggy Landaverde RN, BSN  [facility] [facility]       "

## 2017-06-26 NOTE — NURSING NOTE
"Chief Complaint   Patient presents with     Pre-Op Exam       Initial BP 98/60  Pulse 102  Temp 98.7  F (37.1  C) (Oral)  Ht 5' 7.5\" (1.715 m)  Wt 121 lb (54.9 kg)  SpO2 97%  Breastfeeding? No  BMI 18.67 kg/m2 Estimated body mass index is 18.67 kg/(m^2) as calculated from the following:    Height as of this encounter: 5' 7.5\" (1.715 m).    Weight as of this encounter: 121 lb (54.9 kg).  Medication Reconciliation: complete      "

## 2017-06-26 NOTE — MR AVS SNAPSHOT
After Visit Summary   6/26/2017    Allyson Sauceda    MRN: [id number]           Patient Information     Date Of Birth          1965        Visit Information        Provider Department      6/26/2017 4:00 PM Dia Bolivar PA-C [facility] [place]        Today's Diagnoses     Preop general physical exam    -  1    Pain of right thumb        Pain of finger of left hand        Tobacco abuse        BMI less than 19,adult          Care Instructions      Before Your Surgery      Call your surgeon if there is any change in your health. This includes signs of a cold or flu (such as a sore throat, runny nose, cough, rash or fever).    Do not smoke, drink alcohol or take over the counter medicine (unless your surgeon or primary care doctor tells you to) for the 24 hours before and after surgery.    If you take prescribed drugs: Follow your doctor s orders about which medicines to take and which to stop until after surgery.    Eating and drinking prior to surgery: follow the instructions from your surgeon    Take a shower or bath the night before surgery. Use the soap your surgeon gave you to gently clean your skin. If you do not have soap from your surgeon, use your regular soap. Do not shave or scrub the surgery site.  Wear clean pajamas and have clean sheets on your bed.           Follow-ups after your visit        Your next 10 appointments already scheduled     Jul 03, 2017  3:40 PM CDT   SHORT with Dia Bolivar PA-C   [facility] [place] ([facility])    [address] [address]   [phone]            Jul 17, 2017  1:40 PM CDT   Return Visit with Davis Bella PA-C   [facility] ([facility])    [address]  [facility] [address]   [phone]              Who to contact     If you have questions or need follow up information about today's clinic visit or your schedule  "please contact [facility] [place] directly at [phone].  Normal or non-critical lab and imaging results will be communicated to you by MyChart, letter or phone within 4 business days after the clinic has received the results. If you do not hear from us within 7 days, please contact the clinic through MyChart or phone. If you have a critical or abnormal lab result, we will notify you by phone as soon as possible.  Submit refill requests through [employer] or call your pharmacy and they will forward the refill request to us. Please allow 3 business days for your refill to be completed.          Additional Information About Your Visit        Schedule [facility] Information     [employer] lets you send messages to your doctor, view your test results, renew your prescriptions, schedule appointments and more. To sign up, go to [email or web address] . Click on \"Log in\" on the left side of the screen, which will take you to the Welcome page. Then click on \"Sign up Now\" on the right side of the page.     You will be asked to enter the access code listed below, as well as some personal information. Please follow the directions to create your username and password.     Your access code is: [email or web address]  Expires: 2017 11:09 AM     Your access code will  in 90 days. If you need help or a new code, please call your [facility] clinic or [phone].        Care EveryWhere ID     This is your Care EveryWhere ID. This could be used by other organizations to access your [facility] medical records  [id number]        Your Vitals Were     Pulse Temperature Height Pulse Oximetry Breastfeeding? BMI (Body Mass Index)    72 98.7  F (37.1  C) (Oral) 5' 7.5\" (1.715 m) 97% No 18.67 kg/m2       Blood Pressure from Last 3 Encounters:   17 98/60   17 102/66   17 98/64    Weight from Last 3 Encounters:   17 121 lb (54.9 kg)   17 121 lb (54.9 kg)   17 124 lb (56.2 kg)              We Performed the Following  "    XR Finger Left G/E 2 Views     XR Finger Right G/E 2 Views          Today's Medication Changes          These changes are accurate as of: 6/26/17 11:59 PM.  If you have any questions, ask your nurse or doctor.               Start taking these medicines.        Dose/Directions    order for DME   Used for:  Pain of right thumb   Started by:  Dia Bolivar PA-C        Right wrist/thumb brace   Quantity:  1 Device   Refills:  0            Where to get your medicines      Some of these will need a paper prescription and others can be bought over the counter.  Ask your nurse if you have questions.     Bring a paper prescription for each of these medications     order for DME                Primary Care Provider    None Specified       No primary provider on file.        Equal Access to Services     PANFILO PARKER : Kasey Harrison, skylar denny, ludwig waldrop, kelechi vee. So [facility] [phone].    ATENCIÓN: Si habla español, tiene a thibodeaux disposición servicios gratuitos de asistencia lingüística. Llame al [phone].    We comply with applicable federal civil rights laws and [place] laws. We do not discriminate on the basis of race, color, national origin, age, disability sex, sexual orientation or gender identity.            Thank you!     Thank you for choosing [facility] [place]  for your care. Our goal is always to provide you with excellent care. Hearing back from our patients is one way we can continue to improve our services. Please take a few minutes to complete the written survey that you may receive in the mail after your visit with us. Thank you!             Your Updated Medication List - Protect others around you: Learn how to safely use, store and throw away your medicines at www.disposemymeds.org.          This list is accurate as of: 6/26/17 11:59 PM.  Always use your most recent med list.                   Brand Name Dispense  Instructions for use Diagnosis    HAIR SKIN & NAILS GUMMIES PO           MULTIVITAMIN GUMMIES ADULTS PO           * nicotine 14 MG/24HR 24 hr patch    NICODERM CQ    30 patch    Place 1 patch onto the skin every 24 hours    Tobacco abuse       * nicotine 21 MG/24HR 24 hr patch    NICODERM CQ    30 patch    Place 1 patch onto the skin every 24 hours    Tobacco abuse       * nicotine 7 MG/24HR 24 hr patch    NICODERM CQ    30 patch    Place 1 patch onto the skin every 24 hours    Tobacco abuse       order for DME     1 Device    Right wrist/thumb brace    Pain of right thumb       * Notice:  This list has 3 medication(s) that are the same as other medications prescribed for you. Read the directions carefully, and ask your doctor or other care provider to review them with you.

## 2017-06-28 ENCOUNTER — TELEPHONE (OUTPATIENT)
Dept: FAMILY MEDICINE | Facility: CLINIC | Age: 52
End: 2017-06-28

## 2017-06-28 NOTE — TELEPHONE ENCOUNTER
Patient calling for results from finger x ray.  No result notes and recommendations noted by provider.    Please review.  Elise Kunz RN  Triage [employer]

## 2017-06-28 NOTE — TELEPHONE ENCOUNTER
Please notify pt that her x-rays were normal. Mild hyperextension of the distal part of her 3rd finger was noted by radiology, but likely to be a normal variant in her case given exam. Please follow-up as previously discussed in clinic.  Electronically Signed By: Dia Bolivar PA-C

## 2017-06-29 ENCOUNTER — NURSE TRIAGE (OUTPATIENT)
Dept: NURSING | Facility: CLINIC | Age: 52
End: 2017-06-29

## 2017-06-29 NOTE — TELEPHONE ENCOUNTER
"  Additional Information    Negative: [1] Caller is not with the adult (patient) AND [2] reporting urgent symptoms    Negative: Medication questions    Negative: Lab result questions    Negative: Caller cannot be reached by phone    Negative: Caller has already spoken to PCP or another triager    Negative: RN needs further essential information from caller in order to complete triage    Negative: Requesting regular office appointment    Negative: [1] Caller requesting NON-URGENT health information AND [2] PCP's office is the best resource    Health Information question, no triage required and triager able to answer question     \"I am returning a call from the clinic regarding my Xray results.\" Gave message per epic/MD. Also transferred patient to scheduling for appt.    Protocols used: INFORMATION ONLY CALL-ADULT-    "

## 2017-06-29 NOTE — TELEPHONE ENCOUNTER
Called # [phone]    Left a non detailed VM     Peggy Landaverde RN, BSN  [facility]

## 2017-06-30 NOTE — TELEPHONE ENCOUNTER
Finger is hurting, can't bend.  Type all day at work.  Brace is helping.   Per Dia, she has choices:  !. She could put off surgery for elbow and treat the trigger finger with ibuprofen.  Can't take ibu before the surgery  2. Do surgery then address trigger finger.  She could see same ortho    She will do surgery for elbow then decide what to do next at that point  Symone Pierre RN- Triage [employer]

## 2017-07-05 ENCOUNTER — HOSPITAL PATHOLOGY (OUTPATIENT)
Dept: OTHER | Facility: CLINIC | Age: 52
End: 2017-07-05

## 2017-07-05 ENCOUNTER — TRANSFERRED RECORDS (OUTPATIENT)
Dept: HEALTH INFORMATION MANAGEMENT | Facility: CLINIC | Age: 52
End: 2017-07-05

## 2017-07-07 ENCOUNTER — TELEPHONE (OUTPATIENT)
Dept: ORTHOPEDICS | Facility: CLINIC | Age: 52
End: 2017-07-07

## 2017-07-07 LAB — COPATH REPORT: NORMAL

## 2017-07-07 NOTE — TELEPHONE ENCOUNTER
NA / LVM - Surgical follow up call: Left non descript, confirming follow up and left phone number for questions.    Davis Bella PA-C  [facility] - Surgery

## 2017-07-17 ENCOUNTER — OFFICE VISIT (OUTPATIENT)
Dept: ORTHOPEDICS | Facility: CLINIC | Age: 52
End: 2017-07-17
Payer: COMMERCIAL

## 2017-07-17 DIAGNOSIS — Z47.89 ORTHOPEDIC AFTERCARE: Primary | ICD-10-CM

## 2017-07-17 PROCEDURE — 99024 POSTOP FOLLOW-UP VISIT: CPT | Performed by: PHYSICIAN ASSISTANT

## 2017-07-17 NOTE — LETTER
[facility]  [address] [address] [address]  [phone]          July 17, 2017    RE:  Allyson Sauceda                                                                                                                                                       [address] [place] [address]            To whom it may concern:    Allyson Sauceda is under my professional care for left elbow surgery.      Based on the level of activity allowed, please excuse her from [employer] gym for the month of July 2017.      Sincerely,        Davis Bella PA-C

## 2017-07-17 NOTE — MR AVS SNAPSHOT
After Visit Summary   7/17/2017    Allyson Sauceda    MRN: [id number]           Patient Information     Date Of Birth          1965        Visit Information        Provider Department      7/17/2017 1:40 PM Davis Bella PA-C [facility]        Today's Diagnoses     Orthopedic aftercare    -  1      Care Instructions      Incision Care:  Sutures were removed and Steri-Strips applied in usual fashion.  Keep dry 24-48 hours.  Showering ok after that time, however no soaking or scrubbing of incision for 1 weeks.  Steri-strips will most likely fall off on their own, however they may be removed after 1 weeks with rubbing alcohol if they have not.    If there is any significant drainage after the first 2 days, please cover the incision and notify the office.    Gradually increase your activities as you can tolerated them, starting at a level well below what you would normally do.   Follow up as needed in clinic.            Follow-ups after your visit        Follow-up notes from your care team     Return if symptoms worsen or fail to improve.      Who to contact     If you have questions or need follow up information about today's clinic visit or your schedule please contact [facility] directly at [phone].  Normal or non-critical lab and imaging results will be communicated to you by MyChart, letter or phone within 4 business days after the clinic has received the results. If you do not hear from us within 7 days, please contact the clinic through Reveehart or phone. If you have a critical or abnormal lab result, we will notify you by phone as soon as possible.  Submit refill requests through Voxbone or call your pharmacy and they will forward the refill request to us. Please allow 3 business days for your refill to be completed.          Additional Information About Your Visit        ReveeharRAREFORM Information     Voxbone lets you send messages to your  "doctor, view your test results, renew your prescriptions, schedule appointments and more. To sign up, go to [email or web address] . Click on \"Log in\" on the left side of the screen, which will take you to the Welcome page. Then click on \"Sign up Now\" on the right side of the page.     You will be asked to enter the access code listed below, as well as some personal information. Please follow the directions to create your username and password.     Your access code is: [email or web address]  Expires: 2017 11:09 AM     Your access code will  in 90 days. If you need help or a new code, please call your [place] clinic or [phone].        Care EveryWhere ID     This is your Care EveryWhere ID. This could be used by other organizations to access your [place] medical records  [id number]         Blood Pressure from Last 3 Encounters:   17 98/60   17 102/66   17 98/64    Weight from Last 3 Encounters:   17 121 lb (54.9 kg)   17 121 lb (54.9 kg)   17 124 lb (56.2 kg)              Today, you had the following     No orders found for display       Primary Care Provider    None Specified       No primary provider on file.        Equal Access to Services     [facility]: Hadii addie starkso Soemma, waaxda luqadaha, qaybta kaalmada adeegyada, kelechi anna . So [facility] [phone].    ATENCIÓN: Si habla español, tiene a thibodeaux disposición servicios gratuitos de asistencia lingüística. Llame al [phone].    We comply with applicable federal civil rights laws and [place] laws. We do not discriminate on the basis of race, color, national origin, age, disability sex, sexual orientation or gender identity.            Thank you!     Thank you for choosing [facility]  for your care. Our goal is always to provide you with excellent care. Hearing back from our patients is one way we can continue to improve our services. Please take a few " minutes to complete the written survey that you may receive in the mail after your visit with us. Thank you!             Your Updated Medication List - Protect others around you: Learn how to safely use, store and throw away your medicines at www.disposemymeds.org.          This list is accurate as of: 7/17/17  2:02 PM.  Always use your most recent med list.                   Brand Name Dispense Instructions for use Diagnosis    HAIR SKIN & NAILS GUMMIES PO           MULTIVITAMIN GUMMIES ADULTS PO           * nicotine 14 MG/24HR 24 hr patch    NICODERM CQ    30 patch    Place 1 patch onto the skin every 24 hours    Tobacco abuse       * nicotine 21 MG/24HR 24 hr patch    NICODERM CQ    30 patch    Place 1 patch onto the skin every 24 hours    Tobacco abuse       * nicotine 7 MG/24HR 24 hr patch    NICODERM CQ    30 patch    Place 1 patch onto the skin every 24 hours    Tobacco abuse       order for DME     1 Device    Right wrist/thumb brace    Pain of right thumb       * Notice:  This list has 3 medication(s) that are the same as other medications prescribed for you. Read the directions carefully, and ask your doctor or other care provider to review them with you.

## 2017-07-17 NOTE — PROGRESS NOTES
HISTORY OF PRESENT ILLNESS:    Allyson Sauceda is a 51 year old female who is seen in follow up for Left elbow mass excision, DOS 7/5/2017, Dr. Valadez.  Present symptoms: has not removed surgical dressing.  Using arm limited.  No new [id number].   Denies Chest pain, Calve pain, Fever, Chills.    Current Treatment: post op, dressing.     PHYSICAL EXAM:  There were no vitals taken for this visit.  There is no height or weight on file to calculate BMI.   GENERAL APPEARANCE: healthy, alert and no distress   PSYCH:  mentation appears normal and affect normal/bright    MSK:  Left: Elbow .  Ambulates: WNG.  Incision clean and dry, sutures present, healing.  No incisional erythema.   No Ecchymosis.  No calve pain on palpation.  Edema min at elbow, no fluctuance.  CMS: julio incisional numbness, otherwise grossly intact.  AROM WNL..      IMAGING INTERPRETATION:  None..       ASSESSMENT:  Allyson Sauceda is a 51 year old female S/P Left elbow mass excision, DOS 7/5/2017, Dr. Valadez..  Healing incision  Review path report as benign.    PLAN:  - Surgery discussed, images reviewed if applicable, and all questions were answered at this time.  - sutures removed with sterile technique, steri-strips applied in usual fashion, care instructions given and verbally acknowledged.  - Medications: OTC PRN.  - AAT  - Gym letter.    Return to clinic PRN    Davis Bella PA-C    Dept. Orthopedic Surgery  [facility]   7/17/2017

## 2017-07-26 ENCOUNTER — TELEPHONE (OUTPATIENT)
Dept: ORTHOPEDICS | Facility: CLINIC | Age: 52
End: 2017-07-26

## 2017-07-26 NOTE — TELEPHONE ENCOUNTER
"Allyson called and left voicemail was wondering why her incision seemed to be longer and further \"below\" her elbow that anticipated.    I called her back and explained that it could have been due to Dr. Valadez needing more room to get the entire mass out (i.e iceberg effect). I offered to get more detailed information to her regarding this and let her know that I would send a message to Dr. Valadez and his team to help further clarify.    Barrera Michaud, ATC    "

## 2017-07-27 NOTE — TELEPHONE ENCOUNTER
Left message for patient to return call.     Per operative report, in order to get appropriate skin closure, the incision was made larger.     EVELIO Schulte RN

## 2017-07-31 NOTE — TELEPHONE ENCOUNTER
I called and left voicemail the information below.     No need to call back unless further questions. Closing encounter.    Barrera Michaud, ATC

## 2019-03-01 ENCOUNTER — TELEPHONE (OUTPATIENT)
Dept: OTHER | Facility: CLINIC | Age: 54
End: 2019-03-01

## 2019-03-02 NOTE — TELEPHONE ENCOUNTER
3/1/2019    Call Regarding Onboarding: [employer] I&F    Attempt 1    Message on voicemail     Comments: No Dep      Outreach   DOUG

## 2019-03-18 NOTE — TELEPHONE ENCOUNTER
3/18/2019    Call Regarding Onboarding: [employer] STRIVE    Attempt 2    Message left with patient     Comments: PT ON BOARDED      Outreach   MG

## 2019-10-30 ENCOUNTER — OFFICE VISIT (OUTPATIENT)
Dept: PODIATRY | Facility: CLINIC | Age: 54
End: 2019-10-30
Payer: COMMERCIAL

## 2019-10-30 VITALS — DIASTOLIC BLOOD PRESSURE: 60 MMHG | BODY MASS INDEX: 18.98 KG/M2 | WEIGHT: 123 LBS | SYSTOLIC BLOOD PRESSURE: 100 MMHG

## 2019-10-30 DIAGNOSIS — M79.671 RIGHT FOOT PAIN: Primary | ICD-10-CM

## 2019-10-30 DIAGNOSIS — M20.41 HAMMER TOE OF RIGHT FOOT: ICD-10-CM

## 2019-10-30 DIAGNOSIS — L84 CALLUS OF FOOT: ICD-10-CM

## 2019-10-30 PROCEDURE — 99203 OFFICE O/P NEW LOW 30 MIN: CPT | Performed by: PODIATRIST

## 2019-10-30 NOTE — PROGRESS NOTES
ASSESSMENT/PLAN:    Encounter Diagnoses   Name Primary?     Right foot pain Yes     Callus of foot      Hammer toe of right foot      The cause and nature of calluses was discussed. I explained it is the skin reacting to stress. Even if removed, they tend to reform. Additional surgery is not advised and she prefers to avoid it.  It sounds like the surgery was done to remove an epidermal inclusion cyst, not the callus.      I explained how the painful callus is related to the long 2nd metatarsal, her gait and the contracture of the right 2nd toe.     I trimmed the callus down and cored it out. No charge today. She is to check with her insurance and see if this service is covered in the future.  I am happy to help her in this way.      Recommendations:  Rigid soled shoes were recommended to offload the forefoot during the propulsive phase of gait.   Continue with custom orthoses. Both pairs looked appropriate.  Metatarsal pad option discussed.  Avoidance of barefoot walking  Attempt to self-file the lesions        Body mass index is 18.98 kg/m .    Weight management plan: Patient was referred to their PCP to discuss a diet and exercise plan.      Zach Bergeron DPM, FACFAS, MS    [place]/Foot & Ankle Surgery      ____________________________________________________________________    HPI:         Chief Complaint: pain, bottom of right foot  Onset of problem: years  Pain/ discomfort is described as:  Deep ache  Frequency:  amelia    The pain is made worse with walking, standing, driving  Previous treatment: metatarsal pads, shoe inserts, surgery  She said that she had three surgeries. There was an open wound and she followed up at a wound clinic. She said that there was an inclusion cyst that was removed - more than a callus problem.    *  Patient Active Problem List   Diagnosis     CARDIOVASCULAR SCREENING; LDL GOAL LESS THAN 160     H/O cervical fracture     Post-menopausal     Tobacco abuse      BMI less than 19,adult   *  *  Past Surgical History:   Procedure Laterality Date     EXCISE MASS UPPER EXTREMITY Left 07/05/2017    Procedure: Left elbow mass excision. Surgeon:  Sean Valadez MD  Location: [facility] [place]     NECK SURGERY  2005    C5-6 fracture - has plates placed   *  *  Current Outpatient Medications   Medication Sig Dispense Refill     Biotin w/ Vitamins C & E (HAIR SKIN & NAILS GUMMIES PO)        Multiple Vitamins-Minerals (MULTIVITAMIN GUMMIES ADULTS PO)        nicotine (NICODERM CQ) 14 MG/24HR 24 hr patch Place 1 patch onto the skin every 24 hours (Patient not taking: Reported on 4/12/2017) 30 patch 0     nicotine (NICODERM CQ) 21 MG/24HR 24 hr patch Place 1 patch onto the skin every 24 hours (Patient not taking: Reported on 4/12/2017) 30 patch 0     nicotine (NICODERM CQ) 7 MG/24HR 24 hr patch Place 1 patch onto the skin every 24 hours (Patient not taking: Reported on 4/12/2017) 30 patch 0     order for DME Right wrist/thumb brace (Patient not taking: Reported on 10/30/2019) 1 Device 0       ROS:     A 10-point review of systems was performed and is positive for that noted above in the HPI and as seen below.  All other areas are negative.     Numbness in feet?  no   Calf pain with walking? no  Recent foot/ankle injury? no  Weight change over past 12 months? no  Self perception as overweight? no  Recent flu-like symptoms? no  Joint pain other than feet ? no    Social History: Employment:  ;  Exercise/Physical activity:  no;  Tobacco use:  yes  Social History     Socioeconomic History     Marital status:      Spouse name: Not on file     Number of children: Not on file     Years of education: Not on file     Highest education level: Not on file   Occupational History     Not on file   Social Needs     Financial resource strain: Not on file     Food insecurity:     Worry: Not on file     Inability: Not on file     Transportation needs:     Medical: Not on  file     Non-medical: Not on file   Tobacco Use     Smoking status: Current Every Day Smoker     Packs/day: 0.50     Years: 30.00     Pack years: 15.00     Types: Cigarettes     Smokeless tobacco: Never Used   Substance and Sexual Activity     Alcohol use: No     Drug use: Yes     Comment: Marmonica     Sexual activity: Yes     Partners: Male   Lifestyle     Physical activity:     Days per week: Not on file     Minutes per session: Not on file     Stress: Not on file   Relationships     Social connections:     Talks on phone: Not on file     Gets together: Not on file     Attends Orthodox service: Not on file     Active member of club or organization: Not on file     Attends meetings of clubs or organizations: Not on file     Relationship status: Not on file     Intimate partner violence:     Fear of current or ex partner: Not on file     Emotionally abused: Not on file     Physically abused: Not on file     Forced sexual activity: Not on file   Other Topics Concern     Parent/sibling w/ CABG, MI or angioplasty before 65F 55M? Not Asked   Social History Narrative     Not on file       Family history:  Family History   Problem Relation Age of Onset     Diabetes Mother      Hyperlipidemia Mother      Alzheimer Disease Mother      Hypertension Mother      Alzheimer Disease Maternal Grandmother      Alzheimer Disease Maternal Aunt      Breast Cancer No family hx of      Coronary Artery Disease No family hx of      Colon Cancer No family hx of      Thyroid Disease No family hx of      Genetic Disorder No family hx of        Rheumatoid arthritis:  no  Foot Problems: parent  Diabetes: parent      EXAM:    Vitals: /60   Wt 55.8 kg (123 lb)   BMI 18.98 kg/m    BMI: Body mass index is 18.98 kg/m .  Height: Data Unavailable    Constitutional/ general:  Pt is in no apparent distress, appears well-nourished.  Cooperative with history and physical exam.     Vascular:  Pedal pulses are palpable bilaterally for both the DP  and PT arteries.  CFT < 3 sec.  No edema.  Pedal hair growth noted.     Neuro:  Alert and oriented x 3. Coordinated gait.  Light touch sensation is intact to the L4, L5, S1 distributions. No obvious deficits.  No evidence of neurological-based weakness, spasticity, or contracture in the lower extremities.     Derm: Normal texture and turgor.  No erythema, ecchymosis, or cyanosis.  No open lesions. Thick, nucleated hyperkeratotic lesion sub right 2nd metatarsal head.  Other hyperkeratotic lesions at the 5th met head, heel, plantar medial hallux.    Musculoskeletal:    Lower extremity muscle strength is normal.  Patient is ambulatory without an assistive device or brace .  Decrease in medial longitudinal arch with weight bearing. Mild hallux abducto valgus, right foot. The right 2nd toe has a sagittal plane contracture.  With plantar flexion of her toes, the second metatarsal appears longer than the first.

## 2019-10-30 NOTE — PATIENT INSTRUCTIONS
Thank you for choosing [facility]!    DR. MORGAN'S CLINIC LOCATIONS     MONDAY - [place] WEDNESDAY (AM ONLY) - [place]   [address] [place] [address] [address] [place] [address]   [phone] / [phone] / [phone]       THURSDAY - [place] SCHEDULE SURGERY: [phone]   [address] APPOINTMENTS: [phone]   [place] [address] BILLING QUESTIONS: [phone] / [phone]       Try stiffer soled shoes and felt metatarsal pads.      CALLUS / CORNS / IPKs  When there is excessive friction or pressure on the skin, the body responds by making the skin thicker to protect the deeper structures from becoming exposed. While this works well to protect the deeper structures, the thickened skin can increase pressure and pain.    CALLUS: Flat, diffuse thickening are simple calluses and they are usually caused by friction. Often these are the result of rubbing on a shoe or going barefoot.    CORNS: Calluses with a central core between the toes are called corns. These result from prominent joints on adjacent toes rubbing together. Theses are a symptom of bone malalignment and will always recur unless the underlying bones are addressed surgically.    IPKs: Calluses with a central core on the ball of the foot are usually IPKs (intractable plantar keratosis). These are caused by excessive pressure from the metatarsals, the bones that make up the ball of the foot. Often one of these bones is too long or too prominent.  Again, these will always recur unless the underlying bone issue is addressed. There is no cure for these. They will either go away by themselves, recur, or more could develop.    ROUTINE MAINTENANCE  1. File them down with a pumice stone or callus file a couple times a week.   2. An electric callus removing device. Amope Pedi Perfect Electronic Pedicure Foot File and Callus Remover can be a good option.    3. Lotion can be applied to soften the callus. A urea based cream such as Kersal or Vanicream or thicker cream with shea butter are good options.  4. Toe spacers or toe covers can be used for corns, gel pads can be used for other lesions on the bottom of the foot.   If there is a surgical pathology noted, such as a prominent bone, often this needs to be addressed surgically to minimize recurrence. However, sometimes the lesion simply migrates to another spot after surgery, so it is not a guaranteed cure.     There was also discussion of the cost structure of callus care if they were to come back and have it treated in the clinic. Explained that if insurance does not cover it, they would be billed. This charge could range from $100 - $160.  They were also provided information on places to get the callus treatment.            SMOKING CESSATION  What's in cigarette smoke? - Cigarette smoke contains over 4,000 chemicals. Nicotine is one of the main ingredients which is an insecticide/herbicide. It is poisonous to our nervous system, increases blood clotting risk, and decreases the body's defenses to fight off infection. Another chemical is Carbon Monoxide is an asphyxiating gas that permanently binds to blood cells and blocks the transport of oxygen. This leads to tissue death and decreases your metabolism. Tar is a chemical that coats your lungs and trachea which impairs new oxygen coming in and carbon dioxide getting out of your body.   How does smoking impact surgery? - Smoking is particularly hazardous with regards to surgery. Surgery puts stress on the body and a smoker's body is already under strain from these chemicals. Putting the two together, especially for an elective surgery, could be a recipe for disaster. Smoking before and after surgery increases your risk of heart problems, slow wound healing, delayed bone healing, blood clots, wound infection and anesthesia complications.   What are the benefits of  quitting? - In 20 minutes your blood pressure will drop back down to normal. In 8 hours the carbon monoxide (a toxic gas) levels in your blood stream will drop by half, and oxygen levels will return to normal. In 48 hours your chance of having a heart attack will have decreased. All nicotine will have left your body. Your sense of taste and smell will return to a normal level. In 72 hours your bronchial tubes will relax, and your energy levels will increase. In 2 weeks your circulation will increase, and it will continue to improve for the next 10 weeks.    Recommendations for elective surgery - Ideally, patients should quit smoking 8 weeks before and at least 2 weeks after elective surgery in order to avoid complications. Simply cutting back on the amount of cigarettes smoked per day does not offer any benefit or decrease the risk of poor wound healing, heart problems, and infection. Smokers should also start taking Vitamin C and B for two weeks before surgery and two weeks after surgery.    Ways to Stop Smokin. Nicotine patches, lozenges, or gum  2. Support Groups  3. Medications (see below)    List of Medications:  1. Varenicline Tartrate (CHANTIX)   2. Bupropion HCL (WELLBUTRIN, ZYBAN) - note: make sure Wellbutrin is for smoking cessation and not other issues   3. Nicotine Patch (NICODERM)   4. Nicotine Inhaler (NICOTROL)   5. Nicotine Gum Nicotine Polacrilex   6. Nicotine Lozenge: Nicotine Polacrilex (COMMIT)   * [facility] offers a smoking support group as well!  Please visit: [email or web address]  If you are interested in these, ask about getting a prescription or talk to your primary care doctor about what may be the best way for you to quit.               FYI: BODY WEIGHT AND YOUR FEET  The following information is included in the after visit summary for all patients. Body weight can be a sensitive issue to discuss in clinic, but we think the following information is very important.  Although we focus on the feet and ankles, we do support the overall health of our patients.     Many things can cause foot and ankle problems. Foot structure, activity level, foot mechanics and injuries are common causes of pain. One very important issue that often goes unmentioned, is body weight. Extra weight can cause increased stress on muscles, ligaments, bones and tendons. Sometimes just a few extra pounds is all it takes to put one over her/his threshold. Without reducing that stress, it can be difficult to alleviate pain. As Foot & Ankle specialists, our job is addressing the lower extremity problem and possible causes. Regarding extra body weight, we encourage patients to discuss diet and weight management plans with their primary care doctors. It is this team approach that gives you the best opportunity for pain relief and getting you back on your feet.      [facility] has a Comprehensive Weight Management Program. This program includes counseling, education, non-surgical and surgical approaches to weight loss. If you are interested in learning more either talk to you primary care provider or call [phone] Valadez to follow up with Primary Care provider regarding elevated blood pressure.

## 2019-10-30 NOTE — LETTER
10/30/2019         RE: Allyson Sauceda  [address] [place] [address]        Dear Colleague,    Thank you for referring your patient, Allyson Sauceda, to the [facility]. Please see a copy of my visit note below.      ASSESSMENT/PLAN:    Encounter Diagnoses   Name Primary?     Right foot pain Yes     Callus of foot      Hammer toe of right foot      The cause and nature of calluses was discussed. I explained it is the skin reacting to stress. Even if removed, they tend to reform. Additional surgery is not advised and she prefers to avoid it.  It sounds like the surgery was done to remove an epidermal inclusion cyst, not the callus.      I explained how the painful callus is related to the long 2nd metatarsal, her gait and the contracture of the right 2nd toe.     I trimmed the callus down and cored it out. No charge today. She is to check with her insurance and see if this service is covered in the future.  I am happy to help her in this way.      Recommendations:  Rigid soled shoes were recommended to offload the forefoot during the propulsive phase of gait.   Continue with custom orthoses. Both pairs looked appropriate.  Metatarsal pad option discussed.  Avoidance of barefoot walking  Attempt to self-file the lesions        Body mass index is 18.98 kg/m .    Weight management plan: Patient was referred to their PCP to discuss a diet and exercise plan.      Zach Bergeron DPM, FACFAS, MS    [facility]/Foot & Ankle Surgery      ____________________________________________________________________    HPI:         Chief Complaint: pain, bottom of right foot  Onset of problem: years  Pain/ discomfort is described as:  Deep ache  Frequency:  amelia    The pain is made worse with walking, standing, driving  Previous treatment: metatarsal pads, shoe inserts, surgery  She said that she had three surgeries. There was an open wound and she followed up at a wound clinic. She said that there was an  inclusion cyst that was removed - more than a callus problem.    *  Patient Active Problem List   Diagnosis     CARDIOVASCULAR SCREENING; LDL GOAL LESS THAN 160     H/O cervical fracture     Post-menopausal     Tobacco abuse     BMI less than 19,adult   *  *  Past Surgical History:   Procedure Laterality Date     EXCISE MASS UPPER EXTREMITY Left 07/05/2017    Procedure: Left elbow mass excision. Surgeon:  Sean Valadez MD  Location: [facility]     NECK SURGERY  2005    C5-6 fracture - has plates placed   *  *  Current Outpatient Medications   Medication Sig Dispense Refill     Biotin w/ Vitamins C & E (HAIR SKIN & NAILS GUMMIES PO)        Multiple Vitamins-Minerals (MULTIVITAMIN GUMMIES ADULTS PO)        nicotine (NICODERM CQ) 14 MG/24HR 24 hr patch Place 1 patch onto the skin every 24 hours (Patient not taking: Reported on 4/12/2017) 30 patch 0     nicotine (NICODERM CQ) 21 MG/24HR 24 hr patch Place 1 patch onto the skin every 24 hours (Patient not taking: Reported on 4/12/2017) 30 patch 0     nicotine (NICODERM CQ) 7 MG/24HR 24 hr patch Place 1 patch onto the skin every 24 hours (Patient not taking: Reported on 4/12/2017) 30 patch 0     order for DME Right wrist/thumb brace (Patient not taking: Reported on 10/30/2019) 1 Device 0       ROS:     A 10-point review of systems was performed and is positive for that noted above in the HPI and as seen below.  All other areas are negative.     Numbness in feet?  no   Calf pain with walking? no  Recent foot/ankle injury? no  Weight change over past 12 months? no  Self perception as overweight? no  Recent flu-like symptoms? no  Joint pain other than feet ? no    Social History: Employment:  ;  Exercise/Physical activity:  no;  Tobacco use:  yes  Social History     Socioeconomic History     Marital status:      Spouse name: Not on file     Number of children: Not on file     Years of education: Not on file     Highest education level: Not  on file   Occupational History     Not on file   Social Needs     Financial resource strain: Not on file     Food insecurity:     Worry: Not on file     Inability: Not on file     Transportation needs:     Medical: Not on file     Non-medical: Not on file   Tobacco Use     Smoking status: Current Every Day Smoker     Packs/day: 0.50     Years: 30.00     Pack years: 15.00     Types: Cigarettes     Smokeless tobacco: Never Used   Substance and Sexual Activity     Alcohol use: No     Drug use: Yes     Comment: Marjuana     Sexual activity: Yes     Partners: Male   Lifestyle     Physical activity:     Days per week: Not on file     Minutes per session: Not on file     Stress: Not on file   Relationships     Social connections:     Talks on phone: Not on file     Gets together: Not on file     Attends Jainism service: Not on file     Active member of club or organization: Not on file     Attends meetings of clubs or organizations: Not on file     Relationship status: Not on file     Intimate partner violence:     Fear of current or ex partner: Not on file     Emotionally abused: Not on file     Physically abused: Not on file     Forced sexual activity: Not on file   Other Topics Concern     Parent/sibling w/ CABG, MI or angioplasty before 65F 55M? Not Asked   Social History Narrative     Not on file       Family history:  Family History   Problem Relation Age of Onset     Diabetes Mother      Hyperlipidemia Mother      Alzheimer Disease Mother      Hypertension Mother      Alzheimer Disease Maternal Grandmother      Alzheimer Disease Maternal Aunt      Breast Cancer No family hx of      Coronary Artery Disease No family hx of      Colon Cancer No family hx of      Thyroid Disease No family hx of      Genetic Disorder No family hx of        Rheumatoid arthritis:  no  Foot Problems: parent  Diabetes: parent      EXAM:    Vitals: /60   Wt 55.8 kg (123 lb)   BMI 18.98 kg/m     BMI: Body mass index is 18.98  kg/m .  Height: Data Unavailable    Constitutional/ general:  Pt is in no apparent distress, appears well-nourished.  Cooperative with history and physical exam.     Vascular:  Pedal pulses are palpable bilaterally for both the DP and PT arteries.  CFT < 3 sec.  No edema.  Pedal hair growth noted.     Neuro:  Alert and oriented x 3. Coordinated gait.  Light touch sensation is intact to the L4, L5, S1 distributions. No obvious deficits.  No evidence of neurological-based weakness, spasticity, or contracture in the lower extremities.     Derm: Normal texture and turgor.  No erythema, ecchymosis, or cyanosis.  No open lesions. Thick, nucleated hyperkeratotic lesion sub right 2nd metatarsal head.  Other hyperkeratotic lesions at the 5th met head, heel, plantar medial hallux.    Musculoskeletal:    Lower extremity muscle strength is normal.  Patient is ambulatory without an assistive device or brace .  Decrease in medial longitudinal arch with weight bearing. Mild hallux abducto valgus, right foot. The right 2nd toe has a sagittal plane contracture.  With plantar flexion of her toes, the second metatarsal appears longer than the first.        Again, thank you for allowing me to participate in the care of your patient.        Sincerely,        Zach Bergeron DPM

## 2019-11-09 ENCOUNTER — HEALTH MAINTENANCE LETTER (OUTPATIENT)
Age: 54
End: 2019-11-09

## 2020-12-06 ENCOUNTER — HEALTH MAINTENANCE LETTER (OUTPATIENT)
Age: 55
End: 2020-12-06

## 2021-07-02 ENCOUNTER — OFFICE VISIT (OUTPATIENT)
Dept: INTERNAL MEDICINE | Facility: CLINIC | Age: 56
End: 2021-07-02
Payer: COMMERCIAL

## 2021-07-02 VITALS
BODY MASS INDEX: 16.26 KG/M2 | RESPIRATION RATE: 16 BRPM | HEIGHT: 68 IN | WEIGHT: 107.3 LBS | DIASTOLIC BLOOD PRESSURE: 80 MMHG | SYSTOLIC BLOOD PRESSURE: 124 MMHG | OXYGEN SATURATION: 97 % | TEMPERATURE: 98.3 F | HEART RATE: 97 BPM

## 2021-07-02 DIAGNOSIS — F17.200 SMOKER: ICD-10-CM

## 2021-07-02 DIAGNOSIS — Z12.11 SPECIAL SCREENING FOR MALIGNANT NEOPLASMS, COLON: ICD-10-CM

## 2021-07-02 DIAGNOSIS — R68.81 EARLY SATIETY: ICD-10-CM

## 2021-07-02 DIAGNOSIS — R63.4 WEIGHT LOSS: Primary | ICD-10-CM

## 2021-07-02 LAB
ERYTHROCYTE [DISTWIDTH] IN BLOOD BY AUTOMATED COUNT: 13.7 % (ref 10–15)
HCT VFR BLD AUTO: 39.4 % (ref 35–47)
HGB BLD-MCNC: 13.2 G/DL (ref 11.7–15.7)
MCH RBC QN AUTO: 28.7 PG (ref 26.5–33)
MCHC RBC AUTO-ENTMCNC: 33.5 G/DL (ref 31.5–36.5)
MCV RBC AUTO: 86 FL (ref 78–100)
PLATELET # BLD AUTO: 203 10E9/L (ref 150–450)
RBC # BLD AUTO: 4.6 10E12/L (ref 3.8–5.2)
WBC # BLD AUTO: 7.8 10E9/L (ref 4–11)

## 2021-07-02 PROCEDURE — 84443 ASSAY THYROID STIM HORMONE: CPT | Performed by: INTERNAL MEDICINE

## 2021-07-02 PROCEDURE — 85027 COMPLETE CBC AUTOMATED: CPT | Performed by: INTERNAL MEDICINE

## 2021-07-02 PROCEDURE — 84439 ASSAY OF FREE THYROXINE: CPT | Performed by: INTERNAL MEDICINE

## 2021-07-02 PROCEDURE — 99204 OFFICE O/P NEW MOD 45 MIN: CPT | Performed by: INTERNAL MEDICINE

## 2021-07-02 PROCEDURE — 80053 COMPREHEN METABOLIC PANEL: CPT | Performed by: INTERNAL MEDICINE

## 2021-07-02 PROCEDURE — 36415 COLL VENOUS BLD VENIPUNCTURE: CPT | Performed by: INTERNAL MEDICINE

## 2021-07-02 ASSESSMENT — MIFFLIN-ST. JEOR: SCORE: 1122.27

## 2021-07-02 NOTE — PROGRESS NOTES
Patient's instructions / PLAN:                                                        Plan:  1. Try Ensure or a different supplement energy during the day  2. Upper endoscopy and colonoscopy  3.  Labs today - [address]   4. Follow up in 1- month for ANNUAL EXAM  5.  Read the printed info about increasing calories   6. We can discuss next time about Chantix or Wellbutrin for quit smoking      ASSESSMENT & PLAN:                                                      56 y/o with unusual BMI of 18-19 comes in today complaining of unwanted weight loss.  I think her weight loss has some explanations: She lost weight when she was unable to eat because of teeth problem,  she has a stressful job, and she is unable to take breaks and eat at her job.  She smokes  She has not had any screening test appropriate for the age.  I advised her to schedule annual exam, EGD and colonoscopy    (R63.4) Weight loss  (primary encounter diagnosis)  Comment:   Plan: GASTROENTEROLOGY ADULT REF PROCEDURE ONLY, CBC         with platelets, Comprehensive metabolic panel,         TSH with free T4 reflex            (R68.81) Early satiety  Comment:   Plan: GASTROENTEROLOGY ADULT REF PROCEDURE ONLY        Degenerative changes of the foot.  No fractures    (Z12.11) Special screening for malignant neoplasms, colon  Comment:   Plan: GASTROENTEROLOGY ADULT REF PROCEDURE ONLY            (F17.200) Smoker  Comment:   Plan: as above        Chief Complaint:                                                        Wt loss      SUBJECTIVE:                                                    History of present illness     Wt loss  -- she has been around 120 lb for many years  -- Fall 2019 -- 3 sore teeth and she admits that the food intake went down   -- march 2020 -- the bad teeth were pulled, but she hasn't regained the appetite or the weight  --  After the teeth were pulled she hasn't lost more weight  -- she feels hungry but during the day she doesn't have  "time to eat during the weekdays   -- 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  Those pertinent are above and in the in the Subjective section.  The rest of the systems are negative.        OBJECTIVE:                                                    Physical Exam :    Blood pressure 124/80, pulse 97, temperature 98.3  F (36.8  C), temperature source Oral, resp. rate 16, height 1.715 m (5' 7.5\"), weight 48.7 kg (107 lb 4.8 oz), SpO2 97 %, not currently breastfeeding.   NAD, appears comfortable  Skin: no rashes   Neck: supple, no JVD, No thyroidmegaly. Lymph nodes nonpalpable cervical and supraclavicular.  Chest: clear to auscultation bilaterally, good respiratory effort  Heart: S1 S2, RRR, no mgr appreciated  Abdomen: soft, not tender, no hepatosplenomegaly or masses appreciated, no abdominal bruit, present bowel sounds  Extremities: no edema,   Neurologic: A, Ox3, no focal signs appreciated    PMHx: reviewed  History reviewed. No pertinent past medical history.   PSHx: reviewed  Past Surgical History:   Procedure Laterality Date     EXCISE MASS UPPER EXTREMITY Left 07/05/2017    Procedure: Left elbow mass excision. Surgeon:  Sean Valadez MD  Location: [facility]     NECK SURGERY  2005    C5-6 fracture - has plates placed        Meds: reviewed  Current Outpatient Medications   Medication Sig Dispense Refill     Biotin w/ Vitamins C & E (HAIR SKIN & NAILS GUMMIES PO)          Soc Hx: reviewed  Fam Hx: reviewed          Perla Ennis MD  Internal Medicine       "

## 2021-07-02 NOTE — PATIENT INSTRUCTIONS
Plan:  1. Try Ensure or a different supplement energy during the day  2. Upper endoscopy and colonoscopy  3.  Labs today - [address]   4. Follow up in 1- month for ANNUAL EXAM  5.  Read the printed info about increasing calories  6. We can discuss next time about Chantix or Wellbutrin for quit smoking

## 2021-07-05 LAB
ALBUMIN SERPL-MCNC: 4.2 G/DL (ref 3.4–5)
ALP SERPL-CCNC: 69 U/L (ref 40–150)
ALT SERPL W P-5'-P-CCNC: 26 U/L (ref 0–50)
ANION GAP SERPL CALCULATED.3IONS-SCNC: 2 MMOL/L (ref 3–14)
AST SERPL W P-5'-P-CCNC: 21 U/L (ref 0–45)
BILIRUB SERPL-MCNC: 1 MG/DL (ref 0.2–1.3)
BUN SERPL-MCNC: 12 MG/DL (ref 7–30)
CALCIUM SERPL-MCNC: 9.5 MG/DL (ref 8.5–10.1)
CHLORIDE SERPL-SCNC: 107 MMOL/L (ref 94–109)
CO2 SERPL-SCNC: 32 MMOL/L (ref 20–32)
CREAT SERPL-MCNC: 0.81 MG/DL (ref 0.52–1.04)
GFR SERPL CREATININE-BSD FRML MDRD: 82 ML/MIN/{1.73_M2}
GLUCOSE SERPL-MCNC: 70 MG/DL (ref 70–99)
POTASSIUM SERPL-SCNC: 4 MMOL/L (ref 3.4–5.3)
PROT SERPL-MCNC: 7.4 G/DL (ref 6.8–8.8)
SODIUM SERPL-SCNC: 141 MMOL/L (ref 133–144)
T4 FREE SERPL-MCNC: 1.14 NG/DL (ref 0.76–1.46)
TSH SERPL DL<=0.005 MIU/L-ACNC: 0.04 MU/L (ref 0.4–4)

## 2021-07-06 ENCOUNTER — MYC MEDICAL ADVICE (OUTPATIENT)
Dept: INTERNAL MEDICINE | Facility: CLINIC | Age: 56
End: 2021-07-06

## 2021-07-06 DIAGNOSIS — R79.89 ABNORMAL TSH: Primary | ICD-10-CM

## 2021-07-06 DIAGNOSIS — R63.4 WEIGHT LOSS: ICD-10-CM

## 2021-07-07 ENCOUNTER — TELEPHONE (OUTPATIENT)
Dept: ENDOCRINOLOGY | Facility: CLINIC | Age: 56
End: 2021-07-07

## 2021-07-07 NOTE — TELEPHONE ENCOUNTER
[facility] Call Center    Phone Message    May a detailed message be left on voicemail: yes     Reason for Call: Appointment Intake    Referring Provider Name: Perla Hill MD   Diagnosis and/or Symptoms: Abnormal TSH [R79.89]  Weight loss [R63.4]    Per guidelines, send for review.      Action Taken: Message routed to:  [facility] endo    Travel Screening: Not Applicable

## 2021-07-12 NOTE — TELEPHONE ENCOUNTER
RECORDS RECEIVED FROM: internal    DATE RECEIVED: 8.16.21    NOTES (FOR ALL VISITS) STATUS DETAILS   OFFICE NOTES from referring provider internal  Perla Hill,   OFFICE NOTES from other specialist na    ED NOTES na    OPERATIVE REPORT  (thyroid, pituitary, adrenal, parathyroid) na    MEDICATION LIST internal     IMAGING      DEXASCAN na    MRI (BRAIN) na    XR (Chest) na    CT (HEAD/NECK/CHEST/ABDOMEN) na    NUCLEAR  na    ULTRASOUND (HEAD/NECK) na    LABS     DIABETES: HBGA1C, CREATININE, FASTING LIPIDS, MICROALBUMIN URINE, POTASSIUM, TSH, T4    THYROID: TSH, T4, CBC, THYRODLONULIN, TOTAL T3, FREE T4, CALCITONIN, CEA internal  T3- 7.6.21  Cortisol - 7.6.21   TSH/T4- 7.2.21   Thyroid- Peroxid 7.6.21  Cbc- 7.2.21

## 2021-07-14 NOTE — TELEPHONE ENCOUNTER
To schedulers : please schedule with consult service (or open new/MISAEL) within 2 weeks. This could be a virtual visit.      Octavia Dorado MD  Endocrine triage

## 2021-07-15 NOTE — TELEPHONE ENCOUNTER
RECORDS RECEIVED FROM: internal    DATE RECEIVED: 7.23.21    NOTES (FOR ALL VISITS) STATUS DETAILS   OFFICE NOTES from referring provider internal  Perla Hill,   OFFICE NOTES from other specialist na     ED NOTES na     OPERATIVE REPORT  (thyroid, pituitary, adrenal, parathyroid) na     MEDICATION LIST internal      IMAGING        DEXASCAN na     MRI (BRAIN) na     XR (Chest) na     CT (HEAD/NECK/CHEST/ABDOMEN) na     NUCLEAR  na     ULTRASOUND (HEAD/NECK) na     LABS       DIABETES: HBGA1C, CREATININE, FASTING LIPIDS, MICROALBUMIN URINE, POTASSIUM, TSH, T4     THYROID: TSH, T4, CBC, THYRODLONULIN, TOTAL T3, FREE T4, CALCITONIN, CEA internal  T3- 7.6.21  Cortisol - 7.6.21   TSH/T4- 7.2.21   Thyroid- Peroxid 7.6.21  Cbc- 7.2.21

## 2021-07-21 ENCOUNTER — LAB (OUTPATIENT)
Dept: LAB | Facility: CLINIC | Age: 56
End: 2021-07-21
Payer: COMMERCIAL

## 2021-07-21 DIAGNOSIS — R79.89 ABNORMAL TSH: ICD-10-CM

## 2021-07-21 PROCEDURE — 84480 ASSAY TRIIODOTHYRONINE (T3): CPT

## 2021-07-21 PROCEDURE — 84439 ASSAY OF FREE THYROXINE: CPT

## 2021-07-21 PROCEDURE — 36415 COLL VENOUS BLD VENIPUNCTURE: CPT

## 2021-07-21 PROCEDURE — 84443 ASSAY THYROID STIM HORMONE: CPT

## 2021-07-21 PROCEDURE — 86376 MICROSOMAL ANTIBODY EACH: CPT

## 2021-07-21 PROCEDURE — 84481 FREE ASSAY (FT-3): CPT

## 2021-07-21 PROCEDURE — 82533 TOTAL CORTISOL: CPT

## 2021-07-22 LAB
CORTIS SERPL-MCNC: 6.2 UG/DL (ref 4–22)
T3 SERPL-MCNC: 106 NG/DL (ref 60–181)
T3FREE SERPL-MCNC: 3.3 PG/ML (ref 2.3–4.2)

## 2021-07-23 ENCOUNTER — PRE VISIT (OUTPATIENT)
Dept: ENDOCRINOLOGY | Facility: CLINIC | Age: 56
End: 2021-07-23

## 2021-07-23 ENCOUNTER — VIRTUAL VISIT (OUTPATIENT)
Dept: ENDOCRINOLOGY | Facility: CLINIC | Age: 56
End: 2021-07-23
Payer: COMMERCIAL

## 2021-07-23 DIAGNOSIS — R63.4 WEIGHT LOSS: Primary | ICD-10-CM

## 2021-07-23 DIAGNOSIS — R79.89 ABNORMAL TSH: ICD-10-CM

## 2021-07-23 LAB
T4 FREE SERPL-MCNC: 1.19 NG/DL (ref 0.76–1.46)
THYROPEROXIDASE AB SERPL-ACNC: <10 IU/ML
TSH SERPL DL<=0.005 MIU/L-ACNC: 0.25 MU/L (ref 0.4–4)

## 2021-07-23 PROCEDURE — 99205 OFFICE O/P NEW HI 60 MIN: CPT | Mod: GT | Performed by: INTERNAL MEDICINE

## 2021-07-23 NOTE — LETTER
7/23/2021       RE: Allyson Sauceda  [address] [place] [address]     Dear Colleague,    Thank you for referring your patient, Allyson Sauceda, to the [facility]OCR[facility] [place] [facility]. Please see a copy of my visit note below.    Endocrinology Fellow note    Chief complaint:  Allyson is a 55 year old female seen in consultation at the request of Sergio Armas    ASSESSMENT/PLAN:     HISTORY OF PRESENT ILLNESS  Ms. Scott is a 56 yo F who presents for evaluation of abnormal thyroid function testing. She has had weight loss since fall of 2019, had 3 sore teeth after which she had decreased per oral intake. In march 2020 her decayed teeth were pulled, but she hasn't regained the appetite or the weight. Lost 15 lbs in this time frame.   Endorses that she has a stressful job, difficult to find time to eat.   No double vision or eye dryness. Left eye 'stuck shut' when she wakes up occasionally. She has to force it open.   Denies any diarrhea or abdominal pain.   No dysphagia, No swelling over neck. Can fall asleep okay, but has trouble staying asleep. Uncomfortable mattress.   Heat intolerance over the past few months or so. No palpitations. Has been taking Biotin supplements; last took these two weeks ago. No personal or family history of thyroid disease.     Weight changes:  Wt Readings from Last 2 Encounters:   07/02/21 48.7 kg (107 lb 4.8 oz)   10/30/19 55.8 kg (123 lb)     REVIEW OF SYSTEMS    10 system ROS otherwise as per the HPI or negative    Past Medical History  No past medical history on file.    Medications  Current Outpatient Medications   Medication     Biotin w/ Vitamins C & E (HAIR SKIN & NAILS GUMMIES PO)     No current facility-administered medications for this visit.       Current Outpatient Medications   Medication Sig Dispense Refill     Biotin w/ Vitamins C & E (HAIR SKIN & NAILS GUMMIES PO)         Allergies  No Known Allergies    Family History  family history includes Alzheimer Disease in her maternal aunt, maternal grandmother, and mother; Diabetes in her mother; Hyperlipidemia in her mother; Hypertension in her mother.  Multiple slcerosis (brother), Diabetes (brother) No thyroid issues.     Social History  Social History     Tobacco Use     Smoking status: Current Every Day Smoker     Packs/day: 0.50     Years: 30.00     Pack years: 15.00     Types: Cigarettes     Smokeless tobacco: Never Used   Substance Use Topics     Alcohol use: No     Drug use: Yes     Comment: Loy       Physical Exam  There were no vitals taken for this visit.  There is no height or weight on file to calculate BMI.  GENERAL: Healthy, alert and no distress  EYES: Eyes grossly normal to inspection.  No discharge or erythema, or obvious scleral/conjunctival abnormalities.  RESP: No audible wheeze, cough, or visible cyanosis.  No visible retractions or increased work of breathing.    NECK; no visible goiter or mass  SKIN: Visible skin clear. No significant rash, abnormal pigmentation or lesions.  NEURO: Cranial nerves grossly intact.  Mentation and speech appropriate for age.  PSYCH: Mentation appears normal, affect normal/bright, judgement and insight intact, normal speech and appearance well-groomed.    DATA REVIEW    Basic Metabolic Panel  Component      Latest Ref Rng & Units 7/2/2021   Sodium      133 - 144 mmol/L 141   Potassium      3.4 - 5.3 mmol/L 4.0   Chloride      94 - 109 mmol/L 107   Carbon Dioxide      20 - 32 mmol/L 32   Anion Gap      3 - 14 mmol/L 2 (L)   Glucose      70 - 99 mg/dL 70   Urea Nitrogen      7 - 30 mg/dL 12   Creatinine      0.52 - 1.04 mg/dL 0.81   GFR Estimate      >60 mL/min/1.73:m2 82   GFR Estimate If Black      >60 mL/min/1.73:m2 >90   Calcium      8.5 - 10.1 mg/dL 9.5   Bilirubin Total      0.2 - 1.3 mg/dL 1.0   Albumin      3.4 - 5.0 g/dL 4.2   Protein Total      6.8 - 8.8 g/dL 7.4    Alkaline Phosphatase      40 - 150 U/L 69   ALT      0 - 50 U/L 26   AST      0 - 45 U/L 21       Component      Latest Ref Rng & Units 7/21/2021   Free T3      2.3 - 4.2 pg/mL 3.3   Total T3    ng/dL 106       Component      Latest Ref Rng & Units 7/2/2021 7/21/2021   TSH      0.40 - 4.00 mU/L 0.04 (L) 0.25 (L)     Component      Latest Ref Rng & Units 7/2/2021 7/21/2021   T4 Free      0.76 - 1.46 ng/dL 1.14 1.19     Component      Latest Ref Rng & Units 7/21/2021   Thyroid Peroxidase Antibody      <35 IU/mL <10     Cortisol 6.2 (collected at 5.53 PM) 07/21/21      ASSESSMENT/PLAN:     Subclinical Hyperthyroidism  Found to have an isolated low TSH of 0.04.  FT4 1.14, has been persistently normal. Labs are consistent with subclinical hyperthyroidism. She is not having marked symptoms of thyrotoxicosis (denies palpitations and tremors); and weight loss may be attributable to other etiologies. However for completeness we will re-check TFTs in two weeks. It is possible that the biotin supplements she had been taking up till two weeks ago had falsely deranged her lab tests. Alternatively, she may have had a viral thyroiditis and we are now seeing a trend towards resolution of her thyroid function tests, given the improvement in her TSH over time.  We discussed remaining off biotin supplements and repeating thyroid function testing in two weeks (T4, TSH, T3). Added on a TSI to her lab draw from 07/21.     Orders Placed This Encounter   Procedures     Thyroid stimulating immunoglobulin     TSH     T4 free     T3 total       Pita Clemons MD  Endocrinology Fellow, PGY4    Due to the COVID 19 pandemic this visit was a video visit. The patient gave verbal consent for the visit today.    I have independently reviewed and interpreted labs, imaging as indicated.       Video Start time with Dr. Perea: 11:20 AM   Visit Stop time: 12:03 PM    64 minutes spent on the date of the encounter doing chart review, history and  exam, documentation and further activities as noted above.    Cierra Perea MD PhD    Division of Endocrinology and Diabetes      Allyson Sauceda  is being evaluated via a billable video visit.      How would you like to obtain your AVS? City BeBeharTymphany  For the video visit, send the invitation by: ana maria  Will anyone else be joining your video visit? No

## 2021-07-23 NOTE — PROGRESS NOTES
Endocrinology Fellow note    Chief complaint:  Allyson is a 55 year old female seen in consultation at the request of Sergio Armas    ASSESSMENT/PLAN:     HISTORY OF PRESENT ILLNESS  Ms. Scott is a 56 yo F who presents for evaluation of abnormal thyroid function testing. She has had weight loss since fall of 2019, had 3 sore teeth after which she had decreased per oral intake. In march 2020 her decayed teeth were pulled, but she hasn't regained the appetite or the weight. Lost 15 lbs in this time frame.   Endorses that she has a stressful job, difficult to find time to eat.   No double vision or eye dryness. Left eye 'stuck shut' when she wakes up occasionally. She has to force it open.   Denies any diarrhea or abdominal pain.   No dysphagia, No swelling over neck. Can fall asleep okay, but has trouble staying asleep. Uncomfortable mattress.   Heat intolerance over the past few months or so. No palpitations. Has been taking Biotin supplements; last took these two weeks ago. No personal or family history of thyroid disease.     Weight changes:  Wt Readings from Last 2 Encounters:   07/02/21 48.7 kg (107 lb 4.8 oz)   10/30/19 55.8 kg (123 lb)     REVIEW OF SYSTEMS    10 system ROS otherwise as per the HPI or negative    Past Medical History  No past medical history on file.    Medications  Current Outpatient Medications   Medication     Biotin w/ Vitamins C & E (HAIR SKIN & NAILS GUMMIES PO)     No current facility-administered medications for this visit.       Current Outpatient Medications   Medication Sig Dispense Refill     Biotin w/ Vitamins C & E (HAIR SKIN & NAILS GUMMIES PO)        Allergies  No Known Allergies    Family History  family history includes Alzheimer Disease in her maternal aunt, maternal grandmother, and mother; Diabetes in her mother; Hyperlipidemia in her mother; Hypertension in her mother.  Multiple slcerosis (brother), Diabetes (brother) No thyroid issues.     Social  History  Social History     Tobacco Use     Smoking status: Current Every Day Smoker     Packs/day: 0.50     Years: 30.00     Pack years: 15.00     Types: Cigarettes     Smokeless tobacco: Never Used   Substance Use Topics     Alcohol use: No     Drug use: Yes     Comment: Loy       Physical Exam  There were no vitals taken for this visit.  There is no height or weight on file to calculate BMI.  GENERAL: Healthy, alert and no distress  EYES: Eyes grossly normal to inspection.  No discharge or erythema, or obvious scleral/conjunctival abnormalities.  RESP: No audible wheeze, cough, or visible cyanosis.  No visible retractions or increased work of breathing.    NECK; no visible goiter or mass  SKIN: Visible skin clear. No significant rash, abnormal pigmentation or lesions.  NEURO: Cranial nerves grossly intact.  Mentation and speech appropriate for age.  PSYCH: Mentation appears normal, affect normal/bright, judgement and insight intact, normal speech and appearance well-groomed.    DATA REVIEW    Basic Metabolic Panel  Component      Latest Ref Rng & Units 7/2/2021   Sodium      133 - 144 mmol/L 141   Potassium      3.4 - 5.3 mmol/L 4.0   Chloride      94 - 109 mmol/L 107   Carbon Dioxide      20 - 32 mmol/L 32   Anion Gap      3 - 14 mmol/L 2 (L)   Glucose      70 - 99 mg/dL 70   Urea Nitrogen      7 - 30 mg/dL 12   Creatinine      0.52 - 1.04 mg/dL 0.81   GFR Estimate      >60 mL/min/1.73:m2 82   GFR Estimate If Black      >60 mL/min/1.73:m2 >90   Calcium      8.5 - 10.1 mg/dL 9.5   Bilirubin Total      0.2 - 1.3 mg/dL 1.0   Albumin      3.4 - 5.0 g/dL 4.2   Protein Total      6.8 - 8.8 g/dL 7.4   Alkaline Phosphatase      40 - 150 U/L 69   ALT      0 - 50 U/L 26   AST      0 - 45 U/L 21       Component      Latest Ref Rng & Units 7/21/2021   Free T3      2.3 - 4.2 pg/mL 3.3   Total T3    ng/dL 106       Component      Latest Ref Rng & Units 7/2/2021 7/21/2021   TSH      0.40 - 4.00 mU/L 0.04 (L) 0.25  (L)     Component      Latest Ref Rng & Units 7/2/2021 7/21/2021   T4 Free      0.76 - 1.46 ng/dL 1.14 1.19     Component      Latest Ref Rng & Units 7/21/2021   Thyroid Peroxidase Antibody      <35 IU/mL <10     Cortisol 6.2 (collected at 5.53 PM) 07/21/21      ASSESSMENT/PLAN:     Subclinical Hyperthyroidism  Found to have an isolated low TSH of 0.04.  FT4 1.14, has been persistently normal. Labs are consistent with subclinical hyperthyroidism. She is not having marked symptoms of thyrotoxicosis (denies palpitations and tremors); and weight loss may be attributable to other etiologies. However for completeness we will re-check TFTs in two weeks. It is possible that the biotin supplements she had been taking up till two weeks ago had falsely deranged her lab tests. Alternatively, she may have had a viral thyroiditis and we are now seeing a trend towards resolution of her thyroid function tests, given the improvement in her TSH over time.  We discussed remaining off biotin supplements and repeating thyroid function testing in two weeks (T4, TSH, T3). Added on a TSI to her lab draw from 07/21.     Orders Placed This Encounter   Procedures     Thyroid stimulating immunoglobulin     TSH     T4 free     T3 total       Pita Clemons MD  Endocrinology Fellow, PGY4    Due to the COVID 19 pandemic this visit was a video visit. The patient gave verbal consent for the visit today.    I have independently reviewed and interpreted labs, imaging as indicated.       Video Start time with Dr. Perea: 11:20 AM   Visit Stop time: 12:03 PM    64 minutes spent on the date of the encounter doing chart review, history and exam, documentation and further activities as noted above.    Cierra Perea MD PhD    Division of Endocrinology and Diabetes

## 2021-07-23 NOTE — PROGRESS NOTES
Allyson Sauceda  is being evaluated via a billable video visit.      How would you like to obtain your AVS? [employer]  For the video visit, send the invitation by: ana maria  Will anyone else be joining your video visit? No

## 2021-07-23 NOTE — PATIENT INSTRUCTIONS
Please stop taking your biotin supplements.     Please present to the lab for repeat thyroid function testing in 2 weeks: FT4, TSH, Total T3.     We will contact you with the results of your testing.

## 2021-08-16 ENCOUNTER — PRE VISIT (OUTPATIENT)
Dept: ENDOCRINOLOGY | Facility: CLINIC | Age: 56
End: 2021-08-16

## 2021-09-26 ENCOUNTER — HEALTH MAINTENANCE LETTER (OUTPATIENT)
Age: 56
End: 2021-09-26

## 2021-11-21 ENCOUNTER — HEALTH MAINTENANCE LETTER (OUTPATIENT)
Age: 56
End: 2021-11-21

## 2022-01-16 ENCOUNTER — HEALTH MAINTENANCE LETTER (OUTPATIENT)
Age: 57
End: 2022-01-16

## 2022-03-01 ENCOUNTER — VIRTUAL VISIT (OUTPATIENT)
Dept: INTERNAL MEDICINE | Facility: CLINIC | Age: 57
End: 2022-03-01
Payer: COMMERCIAL

## 2022-03-01 VITALS — WEIGHT: 104 LBS | BODY MASS INDEX: 16.05 KG/M2

## 2022-03-01 DIAGNOSIS — M79.671 RIGHT FOOT PAIN: Primary | ICD-10-CM

## 2022-03-01 PROCEDURE — 99213 OFFICE O/P EST LOW 20 MIN: CPT | Mod: TEL | Performed by: NURSE PRACTITIONER

## 2022-03-01 RX ORDER — IBUPROFEN 200 MG
200 TABLET ORAL EVERY 4 HOURS PRN
COMMUNITY

## 2022-03-01 NOTE — PROGRESS NOTES
Allyson is a 56 year old who is being evaluated via a billable video visit.      How would you like to obtain your AVS? Mail a copy  If the video visit is dropped, the invitation should be resent by: Text to cell phone: [phone]  Will anyone else be joining your video visit? No    Video Start Time: 11:49 AM attempted to call   1510 phone visit as she could not connect on her phone     Assessment & Plan     Right foot pain    - Orthopedic  Referral; Future  - Pain Management Referral; Future      20 minutes spent on the date of the encounter doing chart review, history and exam, documentation and further activities per the note       Tobacco Cessation:   reports that she has been smoking cigarettes. She has a 15.00 pack-year smoking history. She has never used smokeless tobacco.  Tobacco Cessation Action Plan: Information offered: Patient not interested at this time    Patient Instructions   Podiatry referral     Pain clinic referral           Return in about 1 year (around 3/1/2023).    ABEL Malik [facility]   Allyson is a 56 year old who presents for the following health issues     HPI   Chief Complaint   Patient presents with     Pain     pain in her legs darryn wants to see if she qualifies for medical marijuana.     Long term leg pain   Surgery 2018  3 surgeries on her foot right  Cyst in foot - reforming - has had to have multiple surgery  Muscle cramps and cannot walk at times   Tried non medical marijuana and does seem to help         Review of Systems   Constitutional, HEENT, cardiovascular, pulmonary, GI, , musculoskeletal, neuro, skin, endocrine and psych systems are negative, except as otherwise noted.      Objective           Vitals:  No vitals were obtained today due to virtual visit.    Physical Exam   GENERAL: alert and no distress  RESP: No audible wheeze, cough  PSYCH: Mentation appears normal                Video-Visit Details    Type of  service:  Video Visit- changed to phone visit as she could not connect with phone     Video End Time:3:22 PM    Originating Location (pt. Location): Home    Distant Location (provider location):  [facility]     Platform used for Video Visit: Other: phone call

## 2022-03-01 NOTE — NURSING NOTE
"Chief Complaint   Patient presents with     Pain     pain in her legs darryn wants to see if she qualifies for medical marijuana.     initial Wt 47.2 kg (104 lb)   LMP  (LMP Unknown)   Breastfeeding No   BMI 16.05 kg/m   Estimated body mass index is 16.05 kg/m  as calculated from the following:    Height as of 7/2/21: 1.715 m (5' 7.5\").    Weight as of this encounter: 47.2 kg (104 lb)..  bp completed using cuff size NA (Not Taken)  JYOTHI LOMAS LPN  "

## 2022-03-11 ENCOUNTER — OFFICE VISIT (OUTPATIENT)
Dept: PODIATRY | Facility: CLINIC | Age: 57
End: 2022-03-11
Payer: COMMERCIAL

## 2022-03-11 VITALS
HEIGHT: 68 IN | SYSTOLIC BLOOD PRESSURE: 102 MMHG | DIASTOLIC BLOOD PRESSURE: 70 MMHG | BODY MASS INDEX: 15.76 KG/M2 | WEIGHT: 104 LBS

## 2022-03-11 DIAGNOSIS — M79.671 BILATERAL FOOT PAIN: Primary | ICD-10-CM

## 2022-03-11 DIAGNOSIS — L84 CORNS AND CALLOSITIES: ICD-10-CM

## 2022-03-11 DIAGNOSIS — M20.42 HAMMERTOE, BILATERAL: ICD-10-CM

## 2022-03-11 DIAGNOSIS — M79.672 BILATERAL FOOT PAIN: Primary | ICD-10-CM

## 2022-03-11 DIAGNOSIS — Z72.0 TOBACCO ABUSE: ICD-10-CM

## 2022-03-11 DIAGNOSIS — L90.9 FAT PAD ATROPHY OF FOOT: ICD-10-CM

## 2022-03-11 DIAGNOSIS — M20.41 HAMMERTOE, BILATERAL: ICD-10-CM

## 2022-03-11 PROCEDURE — 11056 PARNG/CUTG B9 HYPRKR LES 2-4: CPT | Performed by: PODIATRIST

## 2022-03-11 PROCEDURE — 99213 OFFICE O/P EST LOW 20 MIN: CPT | Mod: 25 | Performed by: PODIATRIST

## 2022-03-11 NOTE — PATIENT INSTRUCTIONS
Thank you for choosing [facility] Podiatry / Foot & Ankle Surgery!    DR GONZALEZ'S CLINIC:  [facility]   [address]   [place] [address]      TRIAGE LINE: [phone] - Opt[address]  APPOINTMENTS: [phone]  RADIOLOGY: [phone]  SET UP SURGERY: [phone]  FAX NUMBER: [phone]  BILLING QUESTIONS: [phone]       Follow up: as needed      Recommend Aquaphor or Vaseline to the feet.  Recommend wearing soft soled shoes at all times even around the house.  Recommend quitting smoking.  Recommend follow-up with a vascular doctor if the cramping and spasming in the legs continue.    CALLUS / CORNS / IPKs  When there is excessive friction or pressure on the skin, the body responds by making the skin thicker to protect the deeper structures from becoming exposed. While this works well to protect the deeper structures, the thickened skin can increase pressure and pain.    CALLUS: Flat, diffuse thickening are simple calluses and they are usually caused by friction. Often these are the result of rubbing on a shoe or going barefoot.    CORNS: Calluses with a central core between the toes are called corns. These result from prominent joints on adjacent toes rubbing together. Theses are a symptom of bone malalignment and will always recur unless the underlying bones are addressed surgically.    IPKs: Calluses with a central core on the ball of the foot are usually IPKs (intractable plantar keratosis). These are caused by excessive pressure from the metatarsals, the bones that make up the ball of the foot. Often one of these bones is too long or too prominent.  Again, these will always recur unless the underlying bone issue is addressed. There is no cure for these. They will either go away by themselves, recur, or more could develop.    ROUTINE MAINTENANCE  1. File them down with a pumice stone or callus file a couple times a week.   2. An electric callus removing device. Amope Pedi  Perfect Electronic Pedicure Foot File and Callus Remover can be a good option.   3. Lotion can be applied to soften the callus. A urea based cream such as Kersal or Vanicream or thicker cream with shea butter are good options.  4. Toe spacers or toe covers can be used for corns, gel pads can be used for other lesions on the bottom of the foot.   If there is a surgical pathology noted, such as a prominent bone, often this needs to be addressed surgically to minimize recurrence. However, sometimes the lesion simply migrates to another spot after surgery, so it is not a guaranteed cure.     **If you come back to clinic for treatment, insurance does not cover it, and you would be billed. This charge could range from $100 - $160**    [email or web address] or call 0-714-PEDIFDRESSBOOM  HAMMERTOE PADS / TOE SPLINTS      ROUTINE FOOT CARE (NAIL TRIMMING / CALLUSES)    Go to afcna.org ([employer]es Association) and search for providers near you.  Otherwise, this is a list we have gathered of  recommended locations/providers in [place].    [facility]   [phone]   [facility]  [phone]  [email or web address]   [facility]  [phone]  [facility] [place] [facility]  [phone]  [facility].us   [facility]  [address] [place] [address]  [phone] Dhruv Yanez DPM  [address] [place] [address] [phone]   [facility]  [phone] [address] [place] [address]  [facility]  Dr. Alejandro Villavicencio  [phone]  [facility]  [phone]  [place] [facility] [place] Locations  (does not take BCBS) FYI:  *Some providers accept insurance while others are out of pocket. Please contact them for details*

## 2022-03-11 NOTE — LETTER
3/11/2022         RE: Allyson Sauceda  [address] [place] [address]        Dear Colleague,    Thank you for referring your patient, Allyson Sauceda, to the [facility]. Please see a copy of my visit note below.    PATIENT HISTORY:  Jayleen ROMAN requested I see this patient for their foot issue.  Allyson Sauceda is a 56 year old female who presents to clinic for right foot pain.  Patient states that she has had calluses for years.  She tries to trim them down herself but they are still very sore.  Pain is 7 out of 10 at its worst.  It will burn, throb, ache.  She notes pain at night in bed will shoot up her leg and she says her calves and toes will spasm.  She says the calluses are doing that and she would like surgery to remove them.  Denies specific injury.  Patient notes that she does smoke.    Review of Systems:  Patient denies fever, chills, rash, wound, stiffness,numbness, weakness, heart burn, blood in stool, chest pain with activity, calf pain when walking, shortness of breath with activity, chronic cough, easy bleeding/bruising, swelling of ankles, excessive thirst, fatigue, depression, anxiety.  Patient admits to limping at times.     PAST MEDICAL HISTORY: No past medical history on file.     PAST SURGICAL HISTORY:   Past Surgical History:   Procedure Laterality Date     EXCISE MASS UPPER EXTREMITY Left 07/05/2017    Procedure: Left elbow mass excision. Surgeon:  Sean Valadez MD  Location: [facility]     FOOT SURGERY Right 05/2018    x 3 surgeries  5/2018     NECK SURGERY  2005    C5-6 fracture - has plates placed        MEDICATIONS:   Current Outpatient Medications:      ibuprofen (ADVIL/MOTRIN) 200 MG tablet, Take 200 mg by mouth every 4 hours as needed for mild pain, Disp: , Rfl:      ALLERGIES:  No Known Allergies     SOCIAL HISTORY:   Social History     Socioeconomic History     Marital status:      Spouse name: Not on file     Number of  "children: Not on file     Years of education: Not on file     Highest education level: Not on file   Occupational History     Not on file   Tobacco Use     Smoking status: Current Every Day Smoker     Packs/day: 0.50     Years: 30.00     Pack years: 15.00     Types: Cigarettes     Smokeless tobacco: Never Used   Vaping Use     Vaping Use: Never used   Substance and Sexual Activity     Alcohol use: No     Drug use: Yes     Types: Marijuana     Comment: Marjuana     Sexual activity: Yes     Partners: Male   Other Topics Concern     Parent/sibling w/ CABG, MI or angioplasty before 65F 55M? Not Asked   Social History Narrative     Not on file     Social Determinants of Health     Financial Resource Strain: Not on file   Food Insecurity: Not on file   Transportation Needs: Not on file   Physical Activity: Not on file   Stress: Not on file   Social Connections: Not on file   Intimate Partner Violence: Not on file   Housing Stability: Not on file        FAMILY HISTORY:   Family History   Problem Relation Age of Onset     Diabetes Mother      Hyperlipidemia Mother      Alzheimer Disease Mother      Hypertension Mother      Alzheimer Disease Maternal Grandmother      Alzheimer Disease Maternal Aunt      Breast Cancer No family hx of      Coronary Artery Disease No family hx of      Colon Cancer No family hx of      Thyroid Disease No family hx of      Genetic Disorder No family hx of         EXAM:Vitals: /70   Ht 1.715 m (5' 7.5\")   Wt 47.2 kg (104 lb)   LMP  (LMP Unknown)   BMI 16.05 kg/m      General appearance: Patient is alert and fully cooperative with history & exam.  No sign of distress is noted during the visit.     Psychiatric: Affect is pleasant & appropriate.  Patient appears motivated to improve health.     Respiratory: Breathing is regular & unlabored while sitting.     HEENT: Hearing is intact to spoken word.  Speech is clear.  No gross evidence of visual impairment that would impact ambulation.   "   Dermatologic: Thick hyperkeratotic lesion to plantar aspect of the right second metatarsal head right fifth metatarsal head right plantar heel and left fifth metatarsal head.     Vascular: DP & PT pulses are faintly palpable bilaterally.  No significant edema or varicosities noted.  CFT and skin temperature is normal to both lower extremities.     Neurologic: Lower extremity sensation is intact to light touch.  No evidence of weakness or contracture in the lower extremities.  No evidence of neuropathy.     Musculoskeletal: Patient is ambulatory without assistive device or brace.  Decreased fat pad to the ball of both feet into the heels of both feet.  Semirigid flexible contracture of second toes bilateral.     ASSESSMENT:    Bilateral foot pain  Fat pad atrophy of foot  Corns and callosities  Tobacco abuse  Hammertoe, bilateral     Medical Decision Making/Plan:  Reviewed patient's chart in epic. Discussed causes of keratomas.  They are due to areas of increase friction.  Hammertoes can create these as they put more pressure to the metatarsal head.  Discussed treatments such as using foot file, pumice stone, metatarsal pads, orthotics, and not walking barefoot.     We discussed the cost structure of callus care if they were to come back and have it treated in the clinic if insurance does not cover it and explained that they would be billed. They were also provided information on places to get the callus treatment.    At this time the calluses were debrided.  Please see procedure note below.  We discussed that the calluses do not cause her calf and leg spasming at night that sounds more like a nerve or vascular issue.  Calluses are dead skin and they cause pain when there is pressure on the feet.  Recommend using an electric debrider and using a Vaseline-based lotion daily to the feet.  Recommend not going barefoot and having a memory foam insert or soft soled shoe on at all times even around the house.  Discussed  that I would recommend following up with vascular if she continued to has the spasming and significant pain at night when she is off her foot.  Given that she is a smoker we discussed that there could be some issues with her blood flow.    Patient risk factor: Patient is at low risk for infection.        PRocedure: After verbal consent, the hyperkeratotic lesions x 4 were debrided using a #15 blade without incident. Patient tolerated procedure well.       Kia Bowen DPM, Podiatry/Foot and Ankle Surgery        Again, thank you for allowing me to participate in the care of your patient.        Sincerely,        Kia Bowen DPM, Podiatry/Foot and Ankle Surgery

## 2022-03-11 NOTE — PROGRESS NOTES
PATIENT HISTORY:  Jayleen ROMAN requested I see this patient for their foot issue.  Allyson Sauceda is a 56 year old female who presents to clinic for right foot pain.  Patient states that she has had calluses for years.  She tries to trim them down herself but they are still very sore.  Pain is 7 out of 10 at its worst.  It will burn, throb, ache.  She notes pain at night in bed will shoot up her leg and she says her calves and toes will spasm.  She says the calluses are doing that and she would like surgery to remove them.  Denies specific injury.  Patient notes that she does smoke.    Review of Systems:  Patient denies fever, chills, rash, wound, stiffness,numbness, weakness, heart burn, blood in stool, chest pain with activity, calf pain when walking, shortness of breath with activity, chronic cough, easy bleeding/bruising, swelling of ankles, excessive thirst, fatigue, depression, anxiety.  Patient admits to limping at times.     PAST MEDICAL HISTORY: No past medical history on file.     PAST SURGICAL HISTORY:   Past Surgical History:   Procedure Laterality Date     EXCISE MASS UPPER EXTREMITY Left 07/05/2017    Procedure: Left elbow mass excision. Surgeon:  Sean Valadez MD  Location: [facility]     FOOT SURGERY Right 05/2018    x 3 surgeries  5/2018     NECK SURGERY  2005    C5-6 fracture - has plates placed        MEDICATIONS:   Current Outpatient Medications:      ibuprofen (ADVIL/MOTRIN) 200 MG tablet, Take 200 mg by mouth every 4 hours as needed for mild pain, Disp: , Rfl:      ALLERGIES:  No Known Allergies     SOCIAL HISTORY:   Social History     Socioeconomic History     Marital status:      Spouse name: Not on file     Number of children: Not on file     Years of education: Not on file     Highest education level: Not on file   Occupational History     Not on file   Tobacco Use     Smoking status: Current Every Day Smoker     Packs/day: 0.50     Years: 30.00     Pack years:  "15.00     Types: Cigarettes     Smokeless tobacco: Never Used   Vaping Use     Vaping Use: Never used   Substance and Sexual Activity     Alcohol use: No     Drug use: Yes     Types: Marijuana     Comment: Marjuana     Sexual activity: Yes     Partners: Male   Other Topics Concern     Parent/sibling w/ CABG, MI or angioplasty before 65F 55M? Not Asked   Social History Narrative     Not on file     Social Determinants of Health     Financial Resource Strain: Not on file   Food Insecurity: Not on file   Transportation Needs: Not on file   Physical Activity: Not on file   Stress: Not on file   Social Connections: Not on file   Intimate Partner Violence: Not on file   Housing Stability: Not on file        FAMILY HISTORY:   Family History   Problem Relation Age of Onset     Diabetes Mother      Hyperlipidemia Mother      Alzheimer Disease Mother      Hypertension Mother      Alzheimer Disease Maternal Grandmother      Alzheimer Disease Maternal Aunt      Breast Cancer No family hx of      Coronary Artery Disease No family hx of      Colon Cancer No family hx of      Thyroid Disease No family hx of      Genetic Disorder No family hx of         EXAM:Vitals: /70   Ht 1.715 m (5' 7.5\")   Wt 47.2 kg (104 lb)   LMP  (LMP Unknown)   BMI 16.05 kg/m      General appearance: Patient is alert and fully cooperative with history & exam.  No sign of distress is noted during the visit.     Psychiatric: Affect is pleasant & appropriate.  Patient appears motivated to improve health.     Respiratory: Breathing is regular & unlabored while sitting.     HEENT: Hearing is intact to spoken word.  Speech is clear.  No gross evidence of visual impairment that would impact ambulation.     Dermatologic: Thick hyperkeratotic lesion to plantar aspect of the right second metatarsal head right fifth metatarsal head right plantar heel and left fifth metatarsal head.     Vascular: DP & PT pulses are faintly palpable bilaterally.  No " significant edema or varicosities noted.  CFT and skin temperature is normal to both lower extremities.     Neurologic: Lower extremity sensation is intact to light touch.  No evidence of weakness or contracture in the lower extremities.  No evidence of neuropathy.     Musculoskeletal: Patient is ambulatory without assistive device or brace.  Decreased fat pad to the ball of both feet into the heels of both feet.  Semirigid flexible contracture of second toes bilateral.     ASSESSMENT:    Bilateral foot pain  Fat pad atrophy of foot  Corns and callosities  Tobacco abuse  Hammertoe, bilateral     Medical Decision Making/Plan:  Reviewed patient's chart in epic. Discussed causes of keratomas.  They are due to areas of increase friction.  Hammertoes can create these as they put more pressure to the metatarsal head.  Discussed treatments such as using foot file, pumice stone, metatarsal pads, orthotics, and not walking barefoot.     We discussed the cost structure of callus care if they were to come back and have it treated in the clinic if insurance does not cover it and explained that they would be billed. They were also provided information on places to get the callus treatment.    At this time the calluses were debrided.  Please see procedure note below.  We discussed that the calluses do not cause her calf and leg spasming at night that sounds more like a nerve or vascular issue.  Calluses are dead skin and they cause pain when there is pressure on the feet.  Recommend using an electric debrider and using a Vaseline-based lotion daily to the feet.  Recommend not going barefoot and having a memory foam insert or soft soled shoe on at all times even around the house.  Discussed that I would recommend following up with vascular if she continued to has the spasming and significant pain at night when she is off her foot.  Given that she is a smoker we discussed that there could be some issues with her blood  flow.    Patient risk factor: Patient is at low risk for infection.        PRocedure: After verbal consent, the hyperkeratotic lesions x 4 were debrided using a #15 blade without incident. Patient tolerated procedure well.       Kia Bowen DPM, Podiatry/Foot and Ankle Surgery

## 2023-04-23 ENCOUNTER — HEALTH MAINTENANCE LETTER (OUTPATIENT)
Age: 58
End: 2023-04-23

## 2023-08-16 ENCOUNTER — OFFICE VISIT (OUTPATIENT)
Dept: PODIATRY | Facility: CLINIC | Age: 58
End: 2023-08-16
Payer: COMMERCIAL

## 2023-08-16 ENCOUNTER — TRANSFERRED RECORDS (OUTPATIENT)
Dept: HEALTH INFORMATION MANAGEMENT | Facility: CLINIC | Age: 58
End: 2023-08-16

## 2023-08-16 VITALS
WEIGHT: 104 LBS | HEIGHT: 68 IN | DIASTOLIC BLOOD PRESSURE: 76 MMHG | BODY MASS INDEX: 15.76 KG/M2 | SYSTOLIC BLOOD PRESSURE: 110 MMHG

## 2023-08-16 DIAGNOSIS — L90.9 FAT PAD ATROPHY OF FOOT: ICD-10-CM

## 2023-08-16 DIAGNOSIS — L84 CALLUS: ICD-10-CM

## 2023-08-16 DIAGNOSIS — L85.1 ACQUIRED KERATODERMA PALMARIS ET PLANTARIS: ICD-10-CM

## 2023-08-16 DIAGNOSIS — M79.672 FOOT PAIN, BILATERAL: Primary | ICD-10-CM

## 2023-08-16 DIAGNOSIS — M79.671 FOOT PAIN, BILATERAL: Primary | ICD-10-CM

## 2023-08-16 DIAGNOSIS — M20.42 HAMMERTOE, BILATERAL: ICD-10-CM

## 2023-08-16 DIAGNOSIS — Z72.0 TOBACCO ABUSE: ICD-10-CM

## 2023-08-16 DIAGNOSIS — M20.41 HAMMERTOE, BILATERAL: ICD-10-CM

## 2023-08-16 PROCEDURE — 99213 OFFICE O/P EST LOW 20 MIN: CPT | Mod: 25 | Performed by: PODIATRIST

## 2023-08-16 PROCEDURE — 11056 PARNG/CUTG B9 HYPRKR LES 2-4: CPT | Performed by: PODIATRIST

## 2023-08-16 NOTE — PATIENT INSTRUCTIONS
Thank you for choosing [facility] Podiatry / Foot & Ankle Surgery!    DR GONZALEZ'S CLINIC:  [facility]   [address]   [place] [address]      TRIAGE LINE: [phone]  APPOINTMENTS: [phone]  RADIOLOGY: [phone]  SET UP SURGERY: [phone]  PHYSICAL THERAPY: [phone]   FAX NUMBER: [phone]  BILLING QUESTIONS: [phone]       Follow up: As needed      CALLUS / CORNS / IPKs  When there is excessive friction or pressure on the skin, the body responds by making the skin thicker to protect the deeper structures from becoming exposed. While this works well to protect the deeper structures, the thickened skin can increase pressure and pain.    CALLUS: Flat, diffuse thickening are simple calluses and they are usually caused by friction. Often these are the result of rubbing on a shoe or going barefoot.    CORNS: Calluses with a central core between the toes are called corns. These result from prominent joints on adjacent toes rubbing together. Theses are a symptom of bone malalignment and will always recur unless the underlying bones are addressed surgically.    IPKs: Calluses with a central core on the ball of the foot are usually IPKs (intractable plantar keratosis). These are caused by excessive pressure from the metatarsals, the bones that make up the ball of the foot. Often one of these bones is too long or too prominent.  Again, these will always recur unless the underlying bone issue is addressed. There is no cure for these. They will either go away by themselves, recur, or more could develop.    ROUTINE MAINTENANCE  1. File them down with a pumice stone or callus file a couple times a week.   2. An electric callus removing device. Amope Pedi Perfect Electronic Pedicure Foot File and Callus Remover can be a good option.   3. Lotion can be applied to soften the callus. A urea based cream such as Kersal or Vanicream or thicker cream with shea butter are good  options.  4. Toe spacers or toe covers can be used for corns, gel pads can be used for other lesions on the bottom of the foot.   If there is a surgical pathology noted, such as a prominent bone, often this needs to be addressed surgically to minimize recurrence. However, sometimes the lesion simply migrates to another spot after surgery, so it is not a guaranteed cure.     **If you come back to clinic for treatment, insurance does not cover it, and you would be billed. This charge could range from $100 - $227**

## 2023-08-16 NOTE — PROGRESS NOTES
"Podiatry / Foot and Ankle Surgery Progress Note    August 16, 2023    Subject: Patient was seen for painful calluses to both feet.  She notes that she has them on the hands as well.  She notes pain is 10 out of 10.  Has inserts but is not wearing them.  States that she does try to trim them down but they  keep coming back.  Wondering what else can be done for them.    Objective:  Vitals: /76   Ht 1.715 m (5' 7.5\")   Wt 47.2 kg (104 lb)   LMP  (LMP Unknown)   BMI 16.05 kg/m    BMI= Body mass index is 16.05 kg/m .    General:  Patient is alert and orientated.  NAD.    Dermatologic: Thick hyperkeratotic lesion to plantar aspect of the right second metatarsal head right fifth metatarsal head right plantar heel and left fifth metatarsal head.     Vascular: DP & PT pulses are faintly palpable bilaterally.  No significant edema or varicosities noted.  CFT and skin temperature is normal to both lower extremities.     Neurologic: Lower extremity sensation is intact to light touch.  No evidence of weakness or contracture in the lower extremities.  No evidence of neuropathy.     Musculoskeletal: Patient is ambulatory without assistive device or brace.  Decreased fat pad to the ball of both feet into the heels of both feet.  Semirigid flexible contracture of second toes bilateral.     ASSESSMENT:     Foot pain, bilateral  Fat pad atrophy of foot  Acquired keratoderma palmaris et plantaris  Callus  Tobacco abuse  Hammertoe, bilateral       Medical Decision Making/Plan:  Reviewed patient's chart in epic. Discussed causes of keratomas.  They are due to areas of increase friction.  Hammertoes can create these as they put more pressure to the metatarsal head.  Discussed treatments such as using foot file, pumice stone, metatarsal pads, orthotics, and not walking barefoot.      We discussed the cost structure of callus care if they were to come back and have it treated in the clinic if insurance does not cover it and " explained that they would be billed. They were also provided information on places to get the callus treatment.     At this time the calluses were debrided.  Please see procedure note below.  We discussed that the calluses do not cause her calf and leg spasming at night that sounds more like a nerve or vascular issue.  Calluses are dead skin and they cause pain when there is pressure on the feet.  Recommend using an electric debrider and using a Vaseline-based lotion daily to the feet.  Recommend not going barefoot and having a memory foam insert or soft soled shoe on at all times even around the house.  Discussed that I would recommend following up with vascular if she continued to has the spasming and significant pain at night when she is off her foot.  Given that she is a smoker we discussed that there could be some issues with her blood flow.     Patient risk factor: Patient is at low risk for infection.         PRocedure: After verbal consent, the hyperkeratotic lesions x 4 were debrided using a #15 blade without incident. Patient tolerated procedure well.      Kia Bowen DPM, Podiatry/Foot and Ankle Surgery

## 2023-08-16 NOTE — LETTER
"    8/16/2023         RE: Allyson Sauceda  [address] [facility] [address]        Dear Colleague,    Thank you for referring your patient, Allyson Sauceda, to the [facility]. Please see a copy of my visit note below.    Podiatry / Foot and Ankle Surgery Progress Note    August 16, 2023    Subject: Patient was seen for painful calluses to both feet.  She notes that she has them on the hands as well.  She notes pain is 10 out of 10.  Has inserts but is not wearing them.  States that she does try to trim them down but they  keep coming back.  Wondering what else can be done for them.    Objective:  Vitals: /76   Ht 1.715 m (5' 7.5\")   Wt 47.2 kg (104 lb)   LMP  (LMP Unknown)   BMI 16.05 kg/m    BMI= Body mass index is 16.05 kg/m .    General:  Patient is alert and orientated.  NAD.    Dermatologic: Thick hyperkeratotic lesion to plantar aspect of the right second metatarsal head right fifth metatarsal head right plantar heel and left fifth metatarsal head.     Vascular: DP & PT pulses are faintly palpable bilaterally.  No significant edema or varicosities noted.  CFT and skin temperature is normal to both lower extremities.     Neurologic: Lower extremity sensation is intact to light touch.  No evidence of weakness or contracture in the lower extremities.  No evidence of neuropathy.     Musculoskeletal: Patient is ambulatory without assistive device or brace.  Decreased fat pad to the ball of both feet into the heels of both feet.  Semirigid flexible contracture of second toes bilateral.     ASSESSMENT:     Foot pain, bilateral  Fat pad atrophy of foot  Acquired keratoderma palmaris et plantaris  Callus  Tobacco abuse  Hammertoe, bilateral       Medical Decision Making/Plan:  Reviewed patient's chart in epic. Discussed causes of keratomas.  They are due to areas of increase friction.  Hammertoes can create these as they put more pressure to the metatarsal head.  " Discussed treatments such as using foot file, pumice stone, metatarsal pads, orthotics, and not walking barefoot.      We discussed the cost structure of callus care if they were to come back and have it treated in the clinic if insurance does not cover it and explained that they would be billed. They were also provided information on places to get the callus treatment.     At this time the calluses were debrided.  Please see procedure note below.  We discussed that the calluses do not cause her calf and leg spasming at night that sounds more like a nerve or vascular issue.  Calluses are dead skin and they cause pain when there is pressure on the feet.  Recommend using an electric debrider and using a Vaseline-based lotion daily to the feet.  Recommend not going barefoot and having a memory foam insert or soft soled shoe on at all times even around the house.  Discussed that I would recommend following up with vascular if she continued to has the spasming and significant pain at night when she is off her foot.  Given that she is a smoker we discussed that there could be some issues with her blood flow.     Patient risk factor: Patient is at low risk for infection.         PRocedure: After verbal consent, the hyperkeratotic lesions x 4 were debrided using a #15 blade without incident. Patient tolerated procedure well.      Kia Bowen DPM, Podiatry/Foot and Ankle Surgery      Again, thank you for allowing me to participate in the care of your patient.        Sincerely,        Kia Bowen DPM, Podiatry/Foot and Ankle Surgery

## 2023-12-02 ENCOUNTER — HEALTH MAINTENANCE LETTER (OUTPATIENT)
Age: 58
End: 2023-12-02

## 2024-06-29 ENCOUNTER — HEALTH MAINTENANCE LETTER (OUTPATIENT)
Age: 59
End: 2024-06-29

## 2024-08-15 ENCOUNTER — TRANSFERRED RECORDS (OUTPATIENT)
Dept: HEALTH INFORMATION MANAGEMENT | Facility: CLINIC | Age: 59
End: 2024-08-15
Payer: COMMERCIAL

## 2025-03-18 ENCOUNTER — ORDERS ONLY (AUTO-RELEASED) (OUTPATIENT)
Dept: FAMILY MEDICINE | Facility: CLINIC | Age: 60
End: 2025-03-18

## 2025-03-18 ENCOUNTER — OFFICE VISIT (OUTPATIENT)
Dept: FAMILY MEDICINE | Facility: CLINIC | Age: 60
End: 2025-03-18
Payer: COMMERCIAL

## 2025-03-18 ENCOUNTER — ANCILLARY PROCEDURE (OUTPATIENT)
Dept: GENERAL RADIOLOGY | Facility: CLINIC | Age: 60
End: 2025-03-18
Attending: PHYSICIAN ASSISTANT
Payer: COMMERCIAL

## 2025-03-18 VITALS
BODY MASS INDEX: 16.37 KG/M2 | SYSTOLIC BLOOD PRESSURE: 108 MMHG | HEIGHT: 68 IN | RESPIRATION RATE: 14 BRPM | OXYGEN SATURATION: 98 % | DIASTOLIC BLOOD PRESSURE: 77 MMHG | HEART RATE: 96 BPM | WEIGHT: 108 LBS | TEMPERATURE: 98.4 F

## 2025-03-18 DIAGNOSIS — M70.21 OLECRANON BURSITIS OF RIGHT ELBOW: ICD-10-CM

## 2025-03-18 DIAGNOSIS — Z12.11 SCREEN FOR COLON CANCER: ICD-10-CM

## 2025-03-18 DIAGNOSIS — Z12.31 VISIT FOR SCREENING MAMMOGRAM: ICD-10-CM

## 2025-03-18 DIAGNOSIS — M70.21 OLECRANON BURSITIS OF RIGHT ELBOW: Primary | ICD-10-CM

## 2025-03-18 DIAGNOSIS — S59.901A INJURY OF RIGHT ELBOW, INITIAL ENCOUNTER: ICD-10-CM

## 2025-03-18 DIAGNOSIS — L72.0 EPIDERMOID CYST: ICD-10-CM

## 2025-03-18 DIAGNOSIS — L81.9 ABNORMAL PIGMENTATION OF SKIN: ICD-10-CM

## 2025-03-18 PROCEDURE — 99204 OFFICE O/P NEW MOD 45 MIN: CPT | Performed by: PHYSICIAN ASSISTANT

## 2025-03-18 PROCEDURE — 3074F SYST BP LT 130 MM HG: CPT | Performed by: PHYSICIAN ASSISTANT

## 2025-03-18 PROCEDURE — 73070 X-RAY EXAM OF ELBOW: CPT | Mod: TC | Performed by: RADIOLOGY

## 2025-03-18 PROCEDURE — 3078F DIAST BP <80 MM HG: CPT | Performed by: PHYSICIAN ASSISTANT

## 2025-03-18 PROCEDURE — G2211 COMPLEX E/M VISIT ADD ON: HCPCS | Performed by: PHYSICIAN ASSISTANT

## 2025-03-18 NOTE — PROGRESS NOTES
Assessment & Plan     Olecranon bursitis of right elbow    Will get xray since she injured it (appears negative per my read). Recommended ace wrap (given today) to wrap elbow and apply pressure. If not improving in the next few weeks, can refer to orthopedics.    - XR Elbow Right 2 Views; Future      Injury of right elbow, initial encounter    See above.    - XR Elbow Right 2 Views; Future      Abnormal pigmentation of skin    Some round lesions with hypopigmentation. She denies other rash and they are on thighs and forearms mostly. Does not appear to be tinea versicolor. Refer to dermatology as she has other complaints as well.    - Adult Dermatology  Referral; Future      Epidermoid cyst    One present in left axilla. May be a second one on left side of neck although smaller and less defined. Refer to dermatology to discuss possible removal.    - Adult Dermatology  Referral; Future      Visit for screening mammogram    - MA Screening Bilateral w/ Charles; Future    Screen for colon cancer    - COLOGUARD([employer]); Future      Nicotine/Tobacco Cessation  She reports that she has been smoking cigarettes. She has a 15 pack-year smoking history. She has never used smokeless tobacco.  Nicotine/Tobacco Cessation Plan  Information offered: Patient not interested at this time            Hayley Valadez is a 59 year old, presenting for the following health issues:  Mass (Under armpit left arm.), Light/dark Spots (Worsening /All over body. ), Derm Problem, and Elbow Problem (Swelling/ fluid right elbow )        3/18/2025    10:30 AM   Additional Questions   Roomed by Hayley Guzman    History of Present Illness       Reason for visit:  Swellin elbow, lump under arm, lost of skin pigmentation, rash on skin  Symptom onset:  More than a month  Symptoms include:  Skin irration  Symptom intensity:  Moderate  Symptom progression:  Worsening  Had these symptoms before:  No  What makes it worse:  No  What  "makes it better:  No   She is taking medications regularly.            Concern - Swelling/ fluid right elbow     Onset: One week  Description: Patient injured her right elbow, does not remember on what, noticed swelling 3 days later. Thinks she hit her elbow on something. It is a little tender. Had a similar problem on her left elbow years ago and ended up needing surgery.   Intensity: mild  Accompanying Signs & Symptoms: Mild pain       Review of Systems  Constitutional, HEENT, cardiovascular, pulmonary, gi and gu systems are negative, except as otherwise noted.        Objective    /77 (BP Location: Right arm, Patient Position: Sitting, Cuff Size: Adult Regular)   Pulse 96   Temp 98.4  F (36.9  C) (Oral)   Resp 14   Ht 1.715 m (5' 7.5\")   Wt 49 kg (108 lb)   LMP  (LMP Unknown)   SpO2 98%   Breastfeeding No   BMI 16.67 kg/m    Body mass index is 16.67 kg/m .      Physical Exam   GENERAL: alert and no distress  EYES: Eyes grossly normal to inspection, PERRL and conjunctivae and sclerae normal  MS: no gross musculoskeletal defects noted, no edema  SKIN: hypopigmentation - arms (also present on upper thighs but patient didn't show me this on exam), epidermoid cyst on left arm near axilla- about 1 cm in diameter  NEURO: Normal strength and tone, mentation intact and speech normal  PSYCH: mentation appears normal, affect normal/bright      Xray - Reviewed and interpreted by me.  Elbow xray is negative for fracture by my read.        Signed Electronically by: Tomi Delgado PA-C    "

## 2025-03-19 ENCOUNTER — PATIENT OUTREACH (OUTPATIENT)
Dept: CARE COORDINATION | Facility: CLINIC | Age: 60
End: 2025-03-19
Payer: COMMERCIAL

## 2025-07-13 ENCOUNTER — HEALTH MAINTENANCE LETTER (OUTPATIENT)
Age: 60
End: 2025-07-13